# Patient Record
Sex: MALE | Race: WHITE | NOT HISPANIC OR LATINO | Employment: STUDENT | URBAN - METROPOLITAN AREA
[De-identification: names, ages, dates, MRNs, and addresses within clinical notes are randomized per-mention and may not be internally consistent; named-entity substitution may affect disease eponyms.]

---

## 2017-01-16 ENCOUNTER — OFFICE VISIT (OUTPATIENT)
Dept: OTOLARYNGOLOGY | Facility: CLINIC | Age: 22
End: 2017-01-16
Payer: COMMERCIAL

## 2017-01-16 VITALS — HEART RATE: 72 BPM | DIASTOLIC BLOOD PRESSURE: 80 MMHG | SYSTOLIC BLOOD PRESSURE: 134 MMHG

## 2017-01-16 DIAGNOSIS — M95.0 NASAL DEFORMITY, ACQUIRED: Primary | ICD-10-CM

## 2017-01-16 DIAGNOSIS — J34.2 NASAL SEPTAL DEVIATION: ICD-10-CM

## 2017-01-16 DIAGNOSIS — Z98.890 POST-OPERATIVE STATE: ICD-10-CM

## 2017-01-16 DIAGNOSIS — J34.3 NASAL TURBINATE HYPERTROPHY: ICD-10-CM

## 2017-01-16 DIAGNOSIS — J35.1 TONSILLAR HYPERTROPHY: ICD-10-CM

## 2017-01-16 PROCEDURE — 99024 POSTOP FOLLOW-UP VISIT: CPT | Mod: S$GLB,,, | Performed by: OTOLARYNGOLOGY

## 2017-01-16 PROCEDURE — 99999 PR PBB SHADOW E&M-EST. PATIENT-LVL II: CPT | Mod: PBBFAC,,, | Performed by: OTOLARYNGOLOGY

## 2017-01-16 NOTE — PROGRESS NOTES
One month S/P nasal reconstruction with bilateral  grafts,septo,turbs, and tonsillectomy.  Healing well,septum and nose straight,airway clear.  Throat healing well.  Reviewed post op instructions again with him.  RTC 2 months

## 2017-07-13 ENCOUNTER — OFFICE VISIT (OUTPATIENT)
Dept: ENDOCRINOLOGY | Facility: CLINIC | Age: 22
End: 2017-07-13
Payer: COMMERCIAL

## 2017-07-13 ENCOUNTER — PATIENT MESSAGE (OUTPATIENT)
Dept: ENDOCRINOLOGY | Facility: CLINIC | Age: 22
End: 2017-07-13

## 2017-07-13 VITALS
SYSTOLIC BLOOD PRESSURE: 118 MMHG | HEIGHT: 73 IN | DIASTOLIC BLOOD PRESSURE: 80 MMHG | BODY MASS INDEX: 28.81 KG/M2 | HEART RATE: 68 BPM | WEIGHT: 217.38 LBS

## 2017-07-13 DIAGNOSIS — E03.9 HYPOTHYROIDISM (ACQUIRED): Primary | ICD-10-CM

## 2017-07-13 DIAGNOSIS — E78.1 HYPERTRIGLYCERIDEMIA: ICD-10-CM

## 2017-07-13 PROCEDURE — 99204 OFFICE O/P NEW MOD 45 MIN: CPT | Mod: S$GLB,,, | Performed by: INTERNAL MEDICINE

## 2017-07-13 PROCEDURE — 99999 PR PBB SHADOW E&M-EST. PATIENT-LVL III: CPT | Mod: PBBFAC,,, | Performed by: INTERNAL MEDICINE

## 2017-07-13 NOTE — PROGRESS NOTES
Subjective:      Patient ID: Adi Siddiqui Jr. is a 21 y.o. male.    Chief Complaint:  Thyroid Problem      History of Present Illness  Mr. Siddiqui presents for evaluation and management of hypothyroidism.    Was diagnosed at age 13 with hypothyroidism.     Current thyroid hormone dose is levothyroxine 175 mcg PO daily.  Takes in the morning on empty stomach. Misses dose at least 2 days per week.    Has some fatigue. Weight has been stable. No heat or cold intolerance. No dry skin. No excessive hair loss.  No heart palpitations, tremor or increased anxiousness.      Also takes Tricor for hypertriglyceridemia.     No history of thyroid nodules. Reports having a normal thyroid ultrasound 3 years ago.    Review of Systems   Constitutional: Negative for unexpected weight change.   Eyes: Negative for visual disturbance.   Respiratory: Negative for shortness of breath.    Cardiovascular: Negative for chest pain.   Gastrointestinal: Negative for abdominal pain.   Endocrine: Negative for cold intolerance.   Genitourinary: Negative for frequency.   Musculoskeletal: Negative for arthralgias.   Skin: Negative for rash.   Neurological: Negative for headaches.       Objective:   Physical Exam   Constitutional: He is oriented to person, place, and time. He appears well-developed and well-nourished.   HENT:   Head: Normocephalic and atraumatic.   Right Ear: External ear normal.   Left Ear: External ear normal.   Nose: Nose normal.   Neck: No tracheal deviation present. No thyromegaly present.   Cardiovascular: Normal rate, regular rhythm and normal heart sounds.    No edema   Pulmonary/Chest: Effort normal and breath sounds normal.   Abdominal: Soft. Bowel sounds are normal. There is no tenderness.   Musculoskeletal:   Normal gait, no cyanosis or clubbing   Neurological: He is alert and oriented to person, place, and time. He has normal reflexes.   Vibration sense intact   Skin: Skin is warm and dry. No rash noted.   No  nodules, no ulcers   Psychiatric: He has a normal mood and affect. Judgment normal.   Vitals reviewed.      Lab Review:   Results for AIDE LOBO JR. (MRN [id number]) as of 7/13/2017 10:45   Ref. Range 11/23/2016 11:40   Sodium Latest Ref Range: 136 - 145 mmol/L 139   Potassium Latest Ref Range: 3.5 - 5.1 mmol/L 3.7   Chloride Latest Ref Range: 95 - 110 mmol/L 104   CO2 Latest Ref Range: 23 - 29 mmol/L 26   Anion Gap Latest Ref Range: 8 - 16 mmol/L 9   BUN, Bld Latest Ref Range: 6 - 20 mg/dL 11   Creatinine Latest Ref Range: 0.5 - 1.4 mg/dL 0.9   eGFR if non African American Latest Ref Range: >60 mL/min/1.73 m^2 >60.0   eGFR if African American Latest Ref Range: >60 mL/min/1.73 m^2 >60.0   Glucose Latest Ref Range: 70 - 110 mg/dL 83   Calcium Latest Ref Range: 8.7 - 10.5 mg/dL 9.6     Reviewed outside labs from 6/2016:  TSH 0.7  FT4 1.6    Assessment:     1. Hypothyroidism (acquired)    2. Hypertriglyceridemia      Plan:     1.) Patient with longstanding hypothyroidism  --Having some fatigue which may be related to the hypothyroidism  --Last TSH 1 yr ago was normal  --Will repeat TSH now  --He will try and take his thyroid hormone daily going forward  --Will plan to repeat TSH in 8 weeks as the current dose of 175 mcg may be too high if he takes the pill daily    2.) Check lipid panel today  --Continue Tricor    Will also check CMP and CBC    RTC in 1 year    Escobar Méndez M.D. Staff Endocrinology

## 2017-07-14 ENCOUNTER — PATIENT MESSAGE (OUTPATIENT)
Dept: ENDOCRINOLOGY | Facility: CLINIC | Age: 22
End: 2017-07-14

## 2017-11-21 ENCOUNTER — LAB VISIT (OUTPATIENT)
Dept: LAB | Facility: HOSPITAL | Age: 22
End: 2017-11-21
Attending: INTERNAL MEDICINE
Payer: COMMERCIAL

## 2017-11-21 DIAGNOSIS — E03.9 HYPOTHYROIDISM (ACQUIRED): ICD-10-CM

## 2017-11-21 LAB — TSH SERPL DL<=0.005 MIU/L-ACNC: 2.39 UIU/ML

## 2017-11-21 PROCEDURE — 36415 COLL VENOUS BLD VENIPUNCTURE: CPT | Mod: PO

## 2017-11-21 PROCEDURE — 84443 ASSAY THYROID STIM HORMONE: CPT

## 2018-02-09 RX ORDER — FENOFIBRATE 48 MG/1
TABLET, FILM COATED ORAL
Qty: 90 TABLET | Refills: 3 | Status: SHIPPED | OUTPATIENT
Start: 2018-02-09 | End: 2019-02-22 | Stop reason: SDUPTHER

## 2018-02-09 RX ORDER — LEVOTHYROXINE SODIUM 175 UG/1
TABLET ORAL
Qty: 90 TABLET | Refills: 3 | Status: SHIPPED | OUTPATIENT
Start: 2018-02-09 | End: 2019-02-22 | Stop reason: SDUPTHER

## 2018-03-17 ENCOUNTER — HOSPITAL ENCOUNTER (EMERGENCY)
Facility: HOSPITAL | Age: 23
Discharge: HOME | End: 2018-03-17
Attending: EMERGENCY MEDICINE
Payer: COMMERCIAL

## 2018-03-17 VITALS
HEIGHT: 72 IN | HEART RATE: 97 BPM | SYSTOLIC BLOOD PRESSURE: 130 MMHG | RESPIRATION RATE: 16 BRPM | TEMPERATURE: 99 F | OXYGEN SATURATION: 98 % | BODY MASS INDEX: 29.12 KG/M2 | DIASTOLIC BLOOD PRESSURE: 67 MMHG | WEIGHT: 215 LBS

## 2018-03-17 DIAGNOSIS — S61.412A LACERATION OF LEFT HAND, INITIAL ENCOUNTER: Primary | ICD-10-CM

## 2018-03-17 DIAGNOSIS — T07.XXXA ABRASIONS OF MULTIPLE SITES: ICD-10-CM

## 2018-03-17 PROCEDURE — 25000003 PHARM REV CODE 250: Performed by: EMERGENCY MEDICINE

## 2018-03-17 PROCEDURE — 12001 RPR S/N/AX/GEN/TRNK 2.5CM/<: CPT

## 2018-03-17 PROCEDURE — 63600175 PHARM REV CODE 636 W HCPCS: Performed by: EMERGENCY MEDICINE

## 2018-03-17 PROCEDURE — 90715 TDAP VACCINE 7 YRS/> IM: CPT | Performed by: EMERGENCY MEDICINE

## 2018-03-17 PROCEDURE — 90471 IMMUNIZATION ADMIN: CPT | Performed by: EMERGENCY MEDICINE

## 2018-03-17 PROCEDURE — 99283 EMERGENCY DEPT VISIT LOW MDM: CPT | Mod: 25

## 2018-03-17 RX ORDER — CEPHALEXIN 250 MG/1
500 CAPSULE ORAL
Status: COMPLETED | OUTPATIENT
Start: 2018-03-17 | End: 2018-03-17

## 2018-03-17 RX ORDER — MUPIROCIN 20 MG/G
OINTMENT TOPICAL 3 TIMES DAILY
Qty: 22 G | Refills: 0 | Status: SHIPPED | OUTPATIENT
Start: 2018-03-17 | End: 2018-03-24

## 2018-03-17 RX ORDER — LIDOCAINE HYDROCHLORIDE 10 MG/ML
10 INJECTION, SOLUTION EPIDURAL; INFILTRATION; INTRACAUDAL; PERINEURAL
Status: COMPLETED | OUTPATIENT
Start: 2018-03-17 | End: 2018-03-17

## 2018-03-17 RX ORDER — CEPHALEXIN 500 MG/1
500 CAPSULE ORAL 4 TIMES DAILY
Qty: 28 CAPSULE | Refills: 0 | Status: SHIPPED | OUTPATIENT
Start: 2018-03-17 | End: 2018-03-24

## 2018-03-17 RX ADMIN — BACITRACIN ZINC NEOMYCIN SULFATE POLYMYXIN B SULFATE 15 G: 400; 3.5; 5 OINTMENT TOPICAL at 09:03

## 2018-03-17 RX ADMIN — CLOSTRIDIUM TETANI TOXOID ANTIGEN (FORMALDEHYDE INACTIVATED), CORYNEBACTERIUM DIPHTHERIAE TOXOID ANTIGEN (FORMALDEHYDE INACTIVATED), BORDETELLA PERTUSSIS TOXOID ANTIGEN (GLUTARALDEHYDE INACTIVATED), BORDETELLA PERTUSSIS FILAMENTOUS HEMAGGLUTININ ANTIGEN (FORMALDEHYDE INACTIVATED), BORDETELLA PERTUSSIS PERTACTIN ANTIGEN, AND BORDETELLA PERTUSSIS FIMBRIAE 2/3 ANTIGEN 0.5 ML: 5; 2; 2.5; 5; 3; 5 INJECTION, SUSPENSION INTRAMUSCULAR at 09:03

## 2018-03-17 RX ADMIN — CEPHALEXIN 500 MG: 250 CAPSULE ORAL at 09:03

## 2018-03-17 RX ADMIN — LIDOCAINE HYDROCHLORIDE 100 MG: 10 INJECTION, SOLUTION EPIDURAL; INFILTRATION; INTRACAUDAL; PERINEURAL at 08:03

## 2018-03-18 NOTE — ED PROVIDER NOTES
Encounter Date: 3/17/2018    SCRIBE #1 NOTE: I, Mona Christiansen, am scribing for, and in the presence of, .       History     Chief Complaint   Patient presents with    Laceration     Lt hand.  Fell onto glass about 2 hours PTA       03/17/2018 8:33 PM     Chief complaint: laceration      Adi Siddiqui Jr. is a 22 y.o. male who presents to the ED with a laceration to left hand. Patient states he was walking around a restaurant outside when he slipped on a step and fell acquiring multiple lacerations to bilateral hands and right forearm. Patient states his last tetanus was 6.5 years ago. He denies hitting his head, LOC, or any preceding symptoms.           The history is provided by the patient. No  was used.     Review of patient's allergies indicates:  No Known Allergies  Past Medical History:   Diagnosis Date    Hypertriglyceridemia     Hypothyroidism      Past Surgical History:   Procedure Laterality Date    EXCISION TURBINATE, SUBMUCOUS      NASAL RECONSTRUCTION      NASAL SEPTUM SURGERY      TONSILLECTOMY       Family History   Problem Relation Age of Onset    Hyperthyroidism Mother     Hyperlipidemia Father     Hypothyroidism Maternal Grandmother     Diabetes Neg Hx      Social History   Substance Use Topics    Smoking status: Never Smoker    Smokeless tobacco: Never Used    Alcohol use Yes     Review of Systems   Constitutional: Negative for fever.   HENT: Negative for sore throat.    Respiratory: Negative for shortness of breath.    Cardiovascular: Negative for chest pain.   Gastrointestinal: Negative for nausea and vomiting.   Genitourinary: Negative for dysuria.   Musculoskeletal: Negative for back pain.   Skin: Negative for rash.        Laceration   Neurological: Negative for weakness.   Hematological: Does not bruise/bleed easily.       Physical Exam     Initial Vitals [03/17/18 2027]   BP Pulse Resp Temp SpO2   130/67 97 16 98.8 °F (37.1 °C) 98 %      MAP        88         Physical Exam    Nursing note and vitals reviewed.  Constitutional: He appears well-developed and well-nourished. No distress.   HENT:   Head: Normocephalic and atraumatic.   Eyes: Conjunctivae and EOM are normal. Pupils are equal, round, and reactive to light.   Neck: Neck supple.   Cardiovascular: Normal rate, regular rhythm and normal heart sounds. Exam reveals no gallop and no friction rub.    No murmur heard.  Pulmonary/Chest: Breath sounds normal. No respiratory distress. He has no wheezes. He has no rhonchi. He has no rales.   Abdominal: Soft. Bowel sounds are normal. He exhibits no distension. There is no tenderness.   Musculoskeletal: Normal range of motion. He exhibits no edema or tenderness.   Neurological: He is alert and oriented to person, place, and time.   Skin: Skin is warm and dry.   Superficial laceration to the thenar eminence 1cm long left hand, 2cm laceration over palmar surface of left hand just proximal to 5th MCP joint that doesn't appear to involve the webbing space or deep enough to involve the tendon. Flexor tendon intact. 4 cm superficial scratches to right palm and 3 superficial lacerations to proximal posterior forearm.   Psychiatric: He has a normal mood and affect.         ED Course   Lac Repair  Date/Time: 3/17/2018 9:31 PM  Performed by: ABIGAIL CARVER  Authorized by: HAYLEY JACKSON   Consent Done: Not Needed  Location: palmar surface of left hand.  Laceration length: 2 cm  Foreign bodies: no foreign bodies  Tendon involvement: none  Nerve involvement: none  Vascular damage: no  Anesthesia: local infiltration    Anesthesia:  Local Anesthetic: lidocaine 1% without epinephrine  Patient sedated: no  Irrigation solution: saline  Irrigation method: syringe  Amount of cleaning: standard  Debridement: none  Degree of undermining: none  Skin closure: 4-0 Prolene  Number of sutures: 7  Approximation: close  Approximation difficulty: simple        Labs Reviewed - No data  to display          Medical Decision Making:   History:   Old Medical Records: I decided to obtain old medical records.  The patient's laceration was repaired without difficulty.  There are no signs of foreign body, neurovascular deficit, or infection at this time.  The patient has been given specific return precautions and referred to primary care for follow up.  I'll prescribe antibiotics given that the wound is been open for 2 hours and it was from a glass bottle on the ground.            Scribe Attestation:   Scribe #1: I performed the above scribed service and the documentation accurately describes the services I performed. I attest to the accuracy of the note.    I, Dr. Garcia Ford personally performed the services described in this documentation. All medical record entries made by the scribe were at my direction and in my presence.  I have reviewed the chart and agree that the record reflects my personal performance and is accurate and complete. Garcia Ford MD.  4:46 PM 03/18/2018    DISCLAIMER: This note was prepared with Dragon NaturallySpeaking voice recognition transcription software. Garbled syntax, mangled pronouns, and other bizarre constructions may be attributed to that software system            Clinical Impression:   The primary encounter diagnosis was Laceration of left hand, initial encounter. A diagnosis of Abrasions of multiple sites was also pertinent to this visit.                           Garcia Ford MD  03/18/18 [id number]

## 2019-02-22 ENCOUNTER — PATIENT MESSAGE (OUTPATIENT)
Dept: INTERNAL MEDICINE | Facility: CLINIC | Age: 24
End: 2019-02-22

## 2019-02-22 ENCOUNTER — OFFICE VISIT (OUTPATIENT)
Dept: INTERNAL MEDICINE | Facility: CLINIC | Age: 24
End: 2019-02-22
Payer: COMMERCIAL

## 2019-02-22 ENCOUNTER — LAB VISIT (OUTPATIENT)
Dept: LAB | Facility: HOSPITAL | Age: 24
End: 2019-02-22
Attending: INTERNAL MEDICINE
Payer: COMMERCIAL

## 2019-02-22 VITALS
WEIGHT: 253.31 LBS | DIASTOLIC BLOOD PRESSURE: 86 MMHG | HEIGHT: 73 IN | BODY MASS INDEX: 33.57 KG/M2 | HEART RATE: 70 BPM | SYSTOLIC BLOOD PRESSURE: 132 MMHG | OXYGEN SATURATION: 96 %

## 2019-02-22 DIAGNOSIS — E03.9 HYPOTHYROIDISM (ACQUIRED): ICD-10-CM

## 2019-02-22 DIAGNOSIS — Z00.00 ROUTINE PHYSICAL EXAMINATION: ICD-10-CM

## 2019-02-22 DIAGNOSIS — E78.1 HYPERTRIGLYCERIDEMIA: ICD-10-CM

## 2019-02-22 DIAGNOSIS — E78.1 HYPERTRIGLYCERIDEMIA: Primary | ICD-10-CM

## 2019-02-22 DIAGNOSIS — Z00.00 ROUTINE PHYSICAL EXAMINATION: Primary | ICD-10-CM

## 2019-02-22 LAB
ALBUMIN SERPL BCP-MCNC: 4 G/DL
ALP SERPL-CCNC: 77 U/L
ALT SERPL W/O P-5'-P-CCNC: 72 U/L
ANION GAP SERPL CALC-SCNC: 10 MMOL/L
AST SERPL-CCNC: 42 U/L
BASOPHILS # BLD AUTO: 0.05 K/UL
BASOPHILS NFR BLD: 0.7 %
BILIRUB SERPL-MCNC: 0.4 MG/DL
BUN SERPL-MCNC: 11 MG/DL
CALCIUM SERPL-MCNC: 10 MG/DL
CHLORIDE SERPL-SCNC: 103 MMOL/L
CHOLEST SERPL-MCNC: 225 MG/DL
CHOLEST/HDLC SERPL: 7.8 {RATIO}
CO2 SERPL-SCNC: 25 MMOL/L
CREAT SERPL-MCNC: 0.9 MG/DL
DIFFERENTIAL METHOD: NORMAL
EOSINOPHIL # BLD AUTO: 0.4 K/UL
EOSINOPHIL NFR BLD: 5.5 %
ERYTHROCYTE [DISTWIDTH] IN BLOOD BY AUTOMATED COUNT: 12.4 %
EST. GFR  (AFRICAN AMERICAN): >60 ML/MIN/1.73 M^2
EST. GFR  (NON AFRICAN AMERICAN): >60 ML/MIN/1.73 M^2
GLUCOSE SERPL-MCNC: 90 MG/DL
HCT VFR BLD AUTO: 44.3 %
HDLC SERPL-MCNC: 29 MG/DL
HDLC SERPL: 12.9 %
HGB BLD-MCNC: 15.1 G/DL
LDLC SERPL CALC-MCNC: ABNORMAL MG/DL
LYMPHOCYTES # BLD AUTO: 1.9 K/UL
LYMPHOCYTES NFR BLD: 25 %
MCH RBC QN AUTO: 30.4 PG
MCHC RBC AUTO-ENTMCNC: 34.1 G/DL
MCV RBC AUTO: 89 FL
MONOCYTES # BLD AUTO: 0.5 K/UL
MONOCYTES NFR BLD: 6.8 %
NEUTROPHILS # BLD AUTO: 4.7 K/UL
NEUTROPHILS NFR BLD: 61.7 %
NONHDLC SERPL-MCNC: 196 MG/DL
PLATELET # BLD AUTO: 237 K/UL
PMV BLD AUTO: 9.5 FL
POTASSIUM SERPL-SCNC: 4.2 MMOL/L
PROT SERPL-MCNC: 8.2 G/DL
RBC # BLD AUTO: 4.97 M/UL
SODIUM SERPL-SCNC: 138 MMOL/L
T4 FREE SERPL-MCNC: 0.94 NG/DL
TRIGL SERPL-MCNC: 1276 MG/DL
TSH SERPL DL<=0.005 MIU/L-ACNC: 6.06 UIU/ML
WBC # BLD AUTO: 7.64 K/UL

## 2019-02-22 PROCEDURE — 80053 COMPREHEN METABOLIC PANEL: CPT

## 2019-02-22 PROCEDURE — 99999 PR PBB SHADOW E&M-EST. PATIENT-LVL III: ICD-10-PCS | Mod: PBBFAC,,, | Performed by: INTERNAL MEDICINE

## 2019-02-22 PROCEDURE — 84439 ASSAY OF FREE THYROXINE: CPT

## 2019-02-22 PROCEDURE — 99385 PR PREVENTIVE VISIT,NEW,18-39: ICD-10-PCS | Mod: S$GLB,,, | Performed by: INTERNAL MEDICINE

## 2019-02-22 PROCEDURE — 99999 PR PBB SHADOW E&M-EST. PATIENT-LVL III: CPT | Mod: PBBFAC,,, | Performed by: INTERNAL MEDICINE

## 2019-02-22 PROCEDURE — 80061 LIPID PANEL: CPT

## 2019-02-22 PROCEDURE — 84443 ASSAY THYROID STIM HORMONE: CPT

## 2019-02-22 PROCEDURE — 85025 COMPLETE CBC W/AUTO DIFF WBC: CPT

## 2019-02-22 PROCEDURE — 36415 COLL VENOUS BLD VENIPUNCTURE: CPT

## 2019-02-22 PROCEDURE — 99385 PREV VISIT NEW AGE 18-39: CPT | Mod: S$GLB,,, | Performed by: INTERNAL MEDICINE

## 2019-02-22 RX ORDER — LEVOTHYROXINE SODIUM 175 UG/1
175 TABLET ORAL DAILY
Qty: 90 TABLET | Refills: 3 | Status: SHIPPED | OUTPATIENT
Start: 2019-02-22 | End: 2020-02-28

## 2019-02-22 RX ORDER — FENOFIBRATE 48 MG/1
48 TABLET, FILM COATED ORAL DAILY
Qty: 90 TABLET | Refills: 3 | Status: SHIPPED | OUTPATIENT
Start: 2019-02-22 | End: 2020-02-28

## 2019-02-22 NOTE — PROGRESS NOTES
Subjective:       Patient ID: Adi Siddiqui Jr. is a 23 y.o. male.    Chief Complaint: Establish Care    3-year-old new patient to me initially seen with history portion the alone.  He has had hypothyroidism since age 13 has been fenofibrate for dyslipidemia years.  He admits he sometimes goes few weeks without taking medication and we had a lengthy discussion about this hypothyroidism side.  He has been in and out of college works almost full-time but is planning on going back school.  Wants to do journalism.  He denies any symptoms or side effects.  We did discuss depression briefly.  He denies suicidal homicidal ideation.  Says it has been minor and his previous doctor thought related to thyroid.  We discussed seeing psychiatry or trying medication but he was not interested for now.      Review of Systems   Constitutional: Positive for fatigue. Negative for activity change, chills, fever and unexpected weight change.   HENT: Negative for hearing loss, nosebleeds, rhinorrhea and trouble swallowing.    Eyes: Negative for pain, discharge and visual disturbance.   Respiratory: Negative for cough, chest tightness, shortness of breath and wheezing.    Cardiovascular: Negative for chest pain and palpitations.   Gastrointestinal: Negative for abdominal pain, blood in stool, constipation, diarrhea, nausea and vomiting.   Endocrine: Negative for polydipsia and polyuria.   Genitourinary: Negative for difficulty urinating, hematuria and urgency.   Musculoskeletal: Negative for arthralgias, joint swelling and neck pain.   Skin: Negative for rash.   Neurological: Negative for dizziness, weakness and headaches.   Hematological: Does not bruise/bleed easily.   Psychiatric/Behavioral: Positive for dysphoric mood. Negative for confusion, sleep disturbance and suicidal ideas.       Objective:      Physical Exam   Constitutional: He is oriented to person, place, and time. He appears well-developed and well-nourished. No  distress.   HENT:   Head: Normocephalic and atraumatic.   Right Ear: External ear normal.   Left Ear: External ear normal.   Mouth/Throat: Oropharynx is clear and moist. No oropharyngeal exudate.   TM's clear, pharynx clear   Eyes: Conjunctivae and EOM are normal. Pupils are equal, round, and reactive to light. No scleral icterus.   Neck: Normal range of motion. Neck supple. No thyromegaly present.   No supraclavicular nodes palpated   Cardiovascular: Normal rate, regular rhythm and normal heart sounds.   No murmur heard.  Pulmonary/Chest: Effort normal and breath sounds normal. He has no wheezes.   Abdominal: Soft. Bowel sounds are normal. He exhibits no mass. There is no tenderness. No hernia. Hernia confirmed negative in the right inguinal area and confirmed negative in the left inguinal area.   Genitourinary: Penis normal. Right testis shows no mass. Left testis shows no mass. No penile tenderness.   Musculoskeletal: He exhibits no edema.   Lymphadenopathy:     He has no cervical adenopathy.   Neurological: He is alert and oriented to person, place, and time.   Skin: No rash noted. No erythema. No pallor.   Psychiatric: He has a normal mood and affect. His behavior is normal.       Assessment:       1. Routine physical examination    2. Hypothyroidism (acquired)    3. Hypertriglyceridemia        Plan:       Adi was seen today for establish care.    Diagnoses and all orders for this visit:    Routine physical examination  -     Lipid panel; Future  -     CBC auto differential; Future  -     Comprehensive metabolic panel; Future  -     TSH; Future    Hypothyroidism (acquired)  -     TSH; Future    Hypertriglyceridemia  -     Lipid panel; Future    Other orders  -     fenofibrate (TRICOR) 48 MG tablet; Take 1 tablet (48 mg total) by mouth once daily.  -     levothyroxine (SYNTHROID, LEVOTHROID) 175 MCG tablet; Take 1 tablet (175 mcg total) by mouth once daily.

## 2019-10-15 ENCOUNTER — PATIENT MESSAGE (OUTPATIENT)
Dept: INTERNAL MEDICINE | Facility: CLINIC | Age: 24
End: 2019-10-15

## 2019-10-15 DIAGNOSIS — E03.9 HYPOTHYROIDISM (ACQUIRED): ICD-10-CM

## 2019-10-15 DIAGNOSIS — E78.1 HYPERTRIGLYCERIDEMIA: Primary | ICD-10-CM

## 2019-11-05 ENCOUNTER — PATIENT MESSAGE (OUTPATIENT)
Dept: INTERNAL MEDICINE | Facility: CLINIC | Age: 24
End: 2019-11-05

## 2019-11-08 ENCOUNTER — LAB VISIT (OUTPATIENT)
Dept: LAB | Facility: HOSPITAL | Age: 24
End: 2019-11-08
Attending: INTERNAL MEDICINE
Payer: COMMERCIAL

## 2019-11-08 DIAGNOSIS — E03.9 HYPOTHYROIDISM (ACQUIRED): ICD-10-CM

## 2019-11-08 DIAGNOSIS — E78.1 HYPERTRIGLYCERIDEMIA: ICD-10-CM

## 2019-11-08 LAB
T4 FREE SERPL-MCNC: 1 NG/DL (ref 0.71–1.51)
TSH SERPL DL<=0.005 MIU/L-ACNC: 6.03 UIU/ML (ref 0.4–4)

## 2019-11-08 PROCEDURE — 84439 ASSAY OF FREE THYROXINE: CPT

## 2019-11-08 PROCEDURE — 84443 ASSAY THYROID STIM HORMONE: CPT

## 2019-11-08 PROCEDURE — 36415 COLL VENOUS BLD VENIPUNCTURE: CPT

## 2019-11-10 ENCOUNTER — PATIENT MESSAGE (OUTPATIENT)
Dept: INTERNAL MEDICINE | Facility: CLINIC | Age: 24
End: 2019-11-10

## 2019-12-05 ENCOUNTER — PATIENT MESSAGE (OUTPATIENT)
Dept: INTERNAL MEDICINE | Facility: CLINIC | Age: 24
End: 2019-12-05

## 2020-02-28 RX ORDER — FENOFIBRATE 48 MG/1
TABLET, FILM COATED ORAL
Qty: 90 TABLET | Refills: 4 | Status: SHIPPED | OUTPATIENT
Start: 2020-02-28 | End: 2020-02-28 | Stop reason: SDUPTHER

## 2020-02-28 RX ORDER — LEVOTHYROXINE SODIUM 175 UG/1
TABLET ORAL
Qty: 90 TABLET | Refills: 0 | Status: SHIPPED | OUTPATIENT
Start: 2020-02-28 | End: 2020-03-10 | Stop reason: SDUPTHER

## 2020-02-28 RX ORDER — FENOFIBRATE 48 MG/1
48 TABLET, FILM COATED ORAL DAILY
Qty: 90 TABLET | Refills: 4 | Status: SHIPPED | OUTPATIENT
Start: 2020-02-28 | End: 2020-03-10 | Stop reason: SDUPTHER

## 2020-02-28 NOTE — TELEPHONE ENCOUNTER
----- Message from Lauren Marr sent at 2/28/2020  4:18 PM CST -----  Contact: Pt-- [phone]  Type:  Needs Medical Advice    Who Called: Pt    Best Call Back Number: [phone]    Additional Information: Pt scheduled for labs without orders for 3/4.

## 2020-02-28 NOTE — TELEPHONE ENCOUNTER
----- Message from Lauren Marr sent at 2/28/2020  4:09 PM CST -----  Contact: Pt--- [phone]  Type:  RX Refill Request    Who Called:  Pt    Refill or New Rx: refill    RX Name and Strength:  fenofibrate (TRICOR) 48 MG tablet    levothyroxine (SYNTHROID, LEVOTHROID) 175 MCG tablet     Preferred Pharmacy with phone number: [address] [address] [facility] [phone] (Phone)  [phone] (Fax)    Would the patient rather a call back or a response via [facility]? Call    Best Call Back Number: [phone]

## 2020-03-09 ENCOUNTER — OFFICE VISIT (OUTPATIENT)
Dept: INTERNAL MEDICINE | Facility: CLINIC | Age: 25
End: 2020-03-09
Payer: COMMERCIAL

## 2020-03-09 ENCOUNTER — LAB VISIT (OUTPATIENT)
Dept: LAB | Facility: HOSPITAL | Age: 25
End: 2020-03-09
Attending: INTERNAL MEDICINE
Payer: COMMERCIAL

## 2020-03-09 VITALS
OXYGEN SATURATION: 98 % | HEIGHT: 72 IN | SYSTOLIC BLOOD PRESSURE: 122 MMHG | WEIGHT: 239.63 LBS | DIASTOLIC BLOOD PRESSURE: 82 MMHG | HEART RATE: 56 BPM | BODY MASS INDEX: 32.46 KG/M2

## 2020-03-09 DIAGNOSIS — E78.1 HYPERTRIGLYCERIDEMIA: Primary | ICD-10-CM

## 2020-03-09 DIAGNOSIS — E03.9 HYPOTHYROIDISM (ACQUIRED): ICD-10-CM

## 2020-03-09 DIAGNOSIS — E78.1 HYPERTRIGLYCERIDEMIA: ICD-10-CM

## 2020-03-09 LAB
ALBUMIN SERPL BCP-MCNC: 4.1 G/DL (ref 3.5–5.2)
ALP SERPL-CCNC: 81 U/L (ref 55–135)
ALT SERPL W/O P-5'-P-CCNC: 64 U/L (ref 10–44)
ANION GAP SERPL CALC-SCNC: 9 MMOL/L (ref 8–16)
AST SERPL-CCNC: 39 U/L (ref 10–40)
BILIRUB SERPL-MCNC: 0.9 MG/DL (ref 0.1–1)
BUN SERPL-MCNC: 7 MG/DL (ref 6–20)
CALCIUM SERPL-MCNC: 9.4 MG/DL (ref 8.7–10.5)
CHLORIDE SERPL-SCNC: 105 MMOL/L (ref 95–110)
CHOLEST SERPL-MCNC: 161 MG/DL (ref 120–199)
CHOLEST/HDLC SERPL: 4.6 {RATIO} (ref 2–5)
CO2 SERPL-SCNC: 26 MMOL/L (ref 23–29)
CREAT SERPL-MCNC: 0.9 MG/DL (ref 0.5–1.4)
EST. GFR  (AFRICAN AMERICAN): >60 ML/MIN/1.73 M^2
EST. GFR  (NON AFRICAN AMERICAN): >60 ML/MIN/1.73 M^2
GLUCOSE SERPL-MCNC: 83 MG/DL (ref 70–110)
HDLC SERPL-MCNC: 35 MG/DL (ref 40–75)
HDLC SERPL: 21.7 % (ref 20–50)
LDLC SERPL CALC-MCNC: 47.6 MG/DL (ref 63–159)
NONHDLC SERPL-MCNC: 126 MG/DL
POTASSIUM SERPL-SCNC: 4.3 MMOL/L (ref 3.5–5.1)
PROT SERPL-MCNC: 7.3 G/DL (ref 6–8.4)
SODIUM SERPL-SCNC: 140 MMOL/L (ref 136–145)
TRIGL SERPL-MCNC: 392 MG/DL (ref 30–150)
TSH SERPL DL<=0.005 MIU/L-ACNC: 3.78 UIU/ML (ref 0.4–4)

## 2020-03-09 PROCEDURE — 99999 PR PBB SHADOW E&M-EST. PATIENT-LVL III: CPT | Mod: PBBFAC,,, | Performed by: INTERNAL MEDICINE

## 2020-03-09 PROCEDURE — 99395 PREV VISIT EST AGE 18-39: CPT | Mod: S$GLB,,, | Performed by: INTERNAL MEDICINE

## 2020-03-09 PROCEDURE — 84443 ASSAY THYROID STIM HORMONE: CPT

## 2020-03-09 PROCEDURE — 36415 COLL VENOUS BLD VENIPUNCTURE: CPT

## 2020-03-09 PROCEDURE — 80053 COMPREHEN METABOLIC PANEL: CPT

## 2020-03-09 PROCEDURE — 99395 PR PREVENTIVE VISIT,EST,18-39: ICD-10-PCS | Mod: S$GLB,,, | Performed by: INTERNAL MEDICINE

## 2020-03-09 PROCEDURE — 80061 LIPID PANEL: CPT

## 2020-03-09 PROCEDURE — 3008F PR BODY MASS INDEX (BMI) DOCUMENTED: ICD-10-PCS | Mod: CPTII,S$GLB,, | Performed by: INTERNAL MEDICINE

## 2020-03-09 PROCEDURE — 3008F BODY MASS INDEX DOCD: CPT | Mod: CPTII,S$GLB,, | Performed by: INTERNAL MEDICINE

## 2020-03-09 PROCEDURE — 99999 PR PBB SHADOW E&M-EST. PATIENT-LVL III: ICD-10-PCS | Mod: PBBFAC,,, | Performed by: INTERNAL MEDICINE

## 2020-03-09 NOTE — PROGRESS NOTES
Subjective:       Patient ID: Adi Siddiqui Jr. is a 24 y.o. male.    Chief Complaint: Annual Exam    Patient comes in for annual exam.  He has a history hypothyroidism and hyperlipidemia with hypertriglyceridemia.  Overall he is doing a better job it taking his medicine, he has lost some weight.  He denies any active or acute complaints or problems. No chest pain or shortness of breath.  He is fasting today so we will get his labs tested and adjust medication if needed.  He has no other active or acute complaints.  I strongly urged him of the need to work on further weight reduction.    Review of Systems   Constitutional: Negative for chills, fatigue, fever and unexpected weight change.   HENT: Negative for nosebleeds and trouble swallowing.    Eyes: Negative for pain and visual disturbance.   Respiratory: Negative for cough, shortness of breath and wheezing.    Cardiovascular: Negative for chest pain and palpitations.   Gastrointestinal: Negative for abdominal pain, constipation, diarrhea, nausea and vomiting.   Genitourinary: Negative for difficulty urinating and hematuria.   Musculoskeletal: Negative for neck pain.   Skin: Negative for rash.   Neurological: Negative for dizziness and headaches.   Hematological: Does not bruise/bleed easily.   Psychiatric/Behavioral: Negative for dysphoric mood, sleep disturbance and suicidal ideas.       Objective:      Physical Exam   Constitutional: He is oriented to person, place, and time. He appears well-developed and well-nourished. No distress.   HENT:   Head: Normocephalic and atraumatic.   Right Ear: External ear normal.   Left Ear: External ear normal.   Mouth/Throat: Oropharynx is clear and moist. No oropharyngeal exudate.   TM's clear, pharynx clear   Eyes: Pupils are equal, round, and reactive to light. Conjunctivae and EOM are normal. No scleral icterus.   Neck: Normal range of motion. Neck supple. No thyromegaly present.   No supraclavicular nodes palpated    Cardiovascular: Normal rate, regular rhythm and normal heart sounds.   No murmur heard.  Pulmonary/Chest: Effort normal and breath sounds normal. He has no wheezes.   Abdominal: Soft. Bowel sounds are normal. He exhibits no mass. There is no tenderness.   Musculoskeletal: He exhibits no edema.   Lymphadenopathy:     He has no cervical adenopathy.   Neurological: He is alert and oriented to person, place, and time.   Skin: No rash noted. No erythema. No pallor.   Psychiatric: He has a normal mood and affect. His behavior is normal.       Assessment:       1. Hypertriglyceridemia    2. Hypothyroidism (acquired)        Plan:       Adi was seen today for annual exam.    Diagnoses and all orders for this visit:    Hypertriglyceridemia  -     Lipid panel; Future  -     TSH; Future  -     Comprehensive metabolic panel; Future    Hypothyroidism (acquired)  -     Lipid panel; Future  -     TSH; Future  -     Comprehensive metabolic panel; Future

## 2020-03-10 ENCOUNTER — TELEPHONE (OUTPATIENT)
Dept: INTERNAL MEDICINE | Facility: CLINIC | Age: 25
End: 2020-03-10

## 2020-03-10 ENCOUNTER — PATIENT MESSAGE (OUTPATIENT)
Dept: INTERNAL MEDICINE | Facility: CLINIC | Age: 25
End: 2020-03-10

## 2020-03-10 RX ORDER — LEVOTHYROXINE SODIUM 175 UG/1
175 TABLET ORAL DAILY
Qty: 90 TABLET | Refills: 4 | Status: SHIPPED | OUTPATIENT
Start: 2020-03-10 | End: 2021-02-19 | Stop reason: SDUPTHER

## 2020-03-10 RX ORDER — FENOFIBRATE 48 MG/1
48 TABLET, FILM COATED ORAL DAILY
Qty: 90 TABLET | Refills: 4 | Status: SHIPPED | OUTPATIENT
Start: 2020-03-10 | End: 2021-03-24 | Stop reason: SDUPTHER

## 2020-09-21 ENCOUNTER — OFFICE VISIT (OUTPATIENT)
Dept: CARDIOLOGY | Facility: CLINIC | Age: 25
End: 2020-09-21
Payer: COMMERCIAL

## 2020-09-21 VITALS
HEART RATE: 81 BPM | HEIGHT: 73 IN | DIASTOLIC BLOOD PRESSURE: 82 MMHG | RESPIRATION RATE: 16 BRPM | BODY MASS INDEX: 31.68 KG/M2 | OXYGEN SATURATION: 98 % | SYSTOLIC BLOOD PRESSURE: 128 MMHG | WEIGHT: 239 LBS

## 2020-09-21 DIAGNOSIS — E03.9 ACQUIRED HYPOTHYROIDISM: ICD-10-CM

## 2020-09-21 DIAGNOSIS — E78.1 FAMILIAL HYPERTRIGLYCERIDEMIA: Primary | ICD-10-CM

## 2020-09-21 DIAGNOSIS — F32.A DEPRESSION, UNSPECIFIED DEPRESSION TYPE: ICD-10-CM

## 2020-09-21 DIAGNOSIS — E66.01 MORBID OBESITY: ICD-10-CM

## 2020-09-21 PROCEDURE — 99204 PR OFFICE/OUTPT VISIT, NEW, LEVL IV, 45-59 MIN: ICD-10-PCS | Mod: S$GLB,,, | Performed by: INTERNAL MEDICINE

## 2020-09-21 PROCEDURE — 99204 OFFICE O/P NEW MOD 45 MIN: CPT | Mod: S$GLB,,, | Performed by: INTERNAL MEDICINE

## 2020-09-21 PROCEDURE — 3008F PR BODY MASS INDEX (BMI) DOCUMENTED: ICD-10-PCS | Mod: CPTII,S$GLB,, | Performed by: INTERNAL MEDICINE

## 2020-09-21 PROCEDURE — 3008F BODY MASS INDEX DOCD: CPT | Mod: CPTII,S$GLB,, | Performed by: INTERNAL MEDICINE

## 2020-09-21 NOTE — PROGRESS NOTES
Subjective:   @SUBJNOHEADERBEGIN  @Patient ID:  Adi Siddiqui Jr. is a 25 y.o. @SEX  @ who presents for evaluation of Results (echo)      HPI:      Review of patient's allergies indicates:  No Known Allergies    Past Medical History:   Diagnosis Date    Depression     Hypertriglyceridemia     Hypothyroidism      Past Surgical History:   Procedure Laterality Date    EXCISION TURBINATE, SUBMUCOUS      NASAL RECONSTRUCTION      NASAL SEPTUM SURGERY      TONSILLECTOMY       Social History     Tobacco Use    Smoking status: Never Smoker    Smokeless tobacco: Never Used   Substance Use Topics    Alcohol use: Yes    Drug use: Not on file        Review of Systems:     ROS  Review of Systems   Constitution: Negative for diaphoresis and fever.   HENT: Negative for nosebleeds.    Cardiovascular: Negative for chest pain, dyspnea on exertion, leg swelling and palpitations.   Respiratory: Negative for shortness of breath and wheezing.    Hematologic/Lymphatic: Negative for bleeding problem. Does not bruise/bleed easily.   Skin: Negative for color change and rash.   Musculoskeletal: Negative for falls and myalgias.   Gastrointestinal: Negative for hematemesis and hematochezia.   Genitourinary: Negative for hematuria.   Neurological: Negative for dizziness and light-headedness.   Psychiatric/Behavioral: Negative for altered mental status and memory loss.          Objective:        Vitals:    09/21/20 1257   BP: 128/82   Pulse: 81   Resp: 16       Lab Results   Component Value Date    WBC 7.64 02/22/2019    HGB 15.1 02/22/2019    HCT 44.3 02/22/2019     02/22/2019    CHOL 161 03/09/2020    TRIG 392 (H) 03/09/2020    HDL 35 (L) 03/09/2020    ALT 64 (H) 03/09/2020    AST 39 03/09/2020     03/09/2020    K 4.3 03/09/2020     03/09/2020    CREATININE 0.9 03/09/2020    BUN 7 03/09/2020    CO2 26 03/09/2020    TSH 3.780 03/09/2020        ECHOCARDIOGRAM RESULTS  No results found for this or any previous  visit.      CURRENT/PREVIOUS VISIT EKG  No results found for this or any previous visit.  No procedure found.   No results found for this or any previous visit.    Physical Exam:    Physical Exam   HEENT: Normocephalic, atraumatic,   PERRL, Conjunctiva pink, no scleral icterus.   NECK:  No JVD no carotid bruit  CVS: S1S2+, RRR, no murmurs, rubs or gallops, JVP: Normal.  LUNGS: Clear  ABDOMEN: Soft, NT, BS+  EXTREMITIES: No cyanosis, clubbing or edema  Gu: No garcia catheter, denies dysuria, no hematuria  NEURO: AAO X 3.   PSY :  Normal affect        Medication List with Changes/Refills   Current Medications    FENOFIBRATE (TRICOR) 48 MG TABLET    Take 1 tablet (48 mg total) by mouth once daily.    LEVOTHYROXINE (SYNTHROID, LEVOTHROID) 175 MCG TABLET    Take 1 tablet (175 mcg total) by mouth once daily.               Assessment:   1.  Familial hypertriglyceridemia  2.  Hypothyroid  3.  Depression  4.  Obesity        Plan:   1.  Patient is been doing well his blood pressure has been stable 128/82.  2.  He had recent blood work done which showed triglycerides to be 392.  Discussed with patient in regards with triglycerides and probably he would benefit from a dietary consult.  And discussed with patient to cut down on carbohydrates since beats and chips and snacks in between and to have 4 small meals a day which are more nutritious and more proteinaceous in nature and LEs and carbohydrates.  3.  Patient is on levothyroxine continue the same.  4.  Patient not been exercising as much.  Patient to resume his exercises peripheral bili walk at least 3 miles a day.  However patient seems to be active during the daytime when he is at work.  5.  Patient had an echocardiogram done which essentially showed normal LV function and normal right ventricular systolic function and normal valvular function.  With ejection fraction of 56%.  6.  Continue current management and I will see him back in the office in a year's time  7.   Patient to follow-up with the primary physician in regards with his labs and also patient would probably benefit from more intensive therapy.    Problem List Items Addressed This Visit     None          No follow-ups on file.

## 2021-02-12 ENCOUNTER — CLINICAL SUPPORT (OUTPATIENT)
Dept: URGENT CARE | Facility: CLINIC | Age: 26
End: 2021-02-12
Payer: COMMERCIAL

## 2021-02-12 DIAGNOSIS — Z11.9 SCREENING EXAMINATION FOR UNSPECIFIED INFECTIOUS DISEASE: Primary | ICD-10-CM

## 2021-02-12 LAB
CTP QC/QA: YES
SARS-COV-2 RDRP RESP QL NAA+PROBE: NEGATIVE

## 2021-02-12 PROCEDURE — 99211 PR OFFICE/OUTPT VISIT, EST, LEVL I: ICD-10-PCS | Mod: S$GLB,,, | Performed by: FAMILY MEDICINE

## 2021-02-12 PROCEDURE — U0002 COVID-19 LAB TEST NON-CDC: HCPCS | Mod: QW,S$GLB,, | Performed by: FAMILY MEDICINE

## 2021-02-12 PROCEDURE — U0002: ICD-10-PCS | Mod: QW,S$GLB,, | Performed by: FAMILY MEDICINE

## 2021-02-12 PROCEDURE — 99211 OFF/OP EST MAY X REQ PHY/QHP: CPT | Mod: S$GLB,,, | Performed by: FAMILY MEDICINE

## 2021-02-14 ENCOUNTER — CLINICAL SUPPORT (OUTPATIENT)
Dept: URGENT CARE | Facility: CLINIC | Age: 26
End: 2021-02-14
Payer: COMMERCIAL

## 2021-02-14 DIAGNOSIS — Z11.9 SCREENING EXAMINATION FOR UNSPECIFIED INFECTIOUS DISEASE: Primary | ICD-10-CM

## 2021-02-14 LAB
CTP QC/QA: YES
SARS-COV-2 RDRP RESP QL NAA+PROBE: NEGATIVE

## 2021-02-14 PROCEDURE — 99211 PR OFFICE/OUTPT VISIT, EST, LEVL I: ICD-10-PCS | Mod: S$GLB,,, | Performed by: FAMILY MEDICINE

## 2021-02-14 PROCEDURE — U0002 COVID-19 LAB TEST NON-CDC: HCPCS | Mod: QW,S$GLB,, | Performed by: FAMILY MEDICINE

## 2021-02-14 PROCEDURE — 99211 OFF/OP EST MAY X REQ PHY/QHP: CPT | Mod: S$GLB,,, | Performed by: FAMILY MEDICINE

## 2021-02-14 PROCEDURE — U0002: ICD-10-PCS | Mod: QW,S$GLB,, | Performed by: FAMILY MEDICINE

## 2021-02-19 ENCOUNTER — PATIENT OUTREACH (OUTPATIENT)
Dept: ADMINISTRATIVE | Facility: OTHER | Age: 26
End: 2021-02-19

## 2021-02-19 ENCOUNTER — LAB VISIT (OUTPATIENT)
Dept: LAB | Facility: HOSPITAL | Age: 26
End: 2021-02-19
Attending: INTERNAL MEDICINE
Payer: COMMERCIAL

## 2021-02-19 ENCOUNTER — OFFICE VISIT (OUTPATIENT)
Dept: ENDOCRINOLOGY | Facility: CLINIC | Age: 26
End: 2021-02-19
Payer: COMMERCIAL

## 2021-02-19 ENCOUNTER — PATIENT MESSAGE (OUTPATIENT)
Dept: ENDOCRINOLOGY | Facility: CLINIC | Age: 26
End: 2021-02-19

## 2021-02-19 VITALS
HEART RATE: 73 BPM | WEIGHT: 236.13 LBS | OXYGEN SATURATION: 97 % | BODY MASS INDEX: 31.3 KG/M2 | DIASTOLIC BLOOD PRESSURE: 78 MMHG | SYSTOLIC BLOOD PRESSURE: 116 MMHG | HEIGHT: 73 IN

## 2021-02-19 DIAGNOSIS — F32.A DEPRESSION, UNSPECIFIED DEPRESSION TYPE: ICD-10-CM

## 2021-02-19 DIAGNOSIS — E78.1 FAMILIAL HYPERTRIGLYCERIDEMIA: ICD-10-CM

## 2021-02-19 DIAGNOSIS — E03.9 ACQUIRED HYPOTHYROIDISM: Primary | ICD-10-CM

## 2021-02-19 DIAGNOSIS — E03.9 ACQUIRED HYPOTHYROIDISM: ICD-10-CM

## 2021-02-19 LAB
ALBUMIN SERPL BCP-MCNC: 4.2 G/DL (ref 3.5–5.2)
ALP SERPL-CCNC: 69 U/L (ref 55–135)
ALT SERPL W/O P-5'-P-CCNC: 33 U/L (ref 10–44)
ANION GAP SERPL CALC-SCNC: 10 MMOL/L (ref 8–16)
AST SERPL-CCNC: 20 U/L (ref 10–40)
BILIRUB SERPL-MCNC: 0.6 MG/DL (ref 0.1–1)
BUN SERPL-MCNC: 10 MG/DL (ref 6–20)
CALCIUM SERPL-MCNC: 9 MG/DL (ref 8.7–10.5)
CHLORIDE SERPL-SCNC: 108 MMOL/L (ref 95–110)
CHOLEST SERPL-MCNC: 155 MG/DL (ref 120–199)
CHOLEST/HDLC SERPL: 5.2 {RATIO} (ref 2–5)
CO2 SERPL-SCNC: 23 MMOL/L (ref 23–29)
CREAT SERPL-MCNC: 0.8 MG/DL (ref 0.5–1.4)
EST. GFR  (AFRICAN AMERICAN): >60 ML/MIN/1.73 M^2
EST. GFR  (NON AFRICAN AMERICAN): >60 ML/MIN/1.73 M^2
GLUCOSE SERPL-MCNC: 96 MG/DL (ref 70–110)
HDLC SERPL-MCNC: 30 MG/DL (ref 40–75)
HDLC SERPL: 19.4 % (ref 20–50)
LDLC SERPL CALC-MCNC: 75.2 MG/DL (ref 63–159)
NONHDLC SERPL-MCNC: 125 MG/DL
POTASSIUM SERPL-SCNC: 3.9 MMOL/L (ref 3.5–5.1)
PROT SERPL-MCNC: 7.2 G/DL (ref 6–8.4)
SODIUM SERPL-SCNC: 141 MMOL/L (ref 136–145)
TRIGL SERPL-MCNC: 249 MG/DL (ref 30–150)
TSH SERPL DL<=0.005 MIU/L-ACNC: 2.74 UIU/ML (ref 0.4–4)

## 2021-02-19 PROCEDURE — 99999 PR PBB SHADOW E&M-EST. PATIENT-LVL III: ICD-10-PCS | Mod: PBBFAC,,, | Performed by: INTERNAL MEDICINE

## 2021-02-19 PROCEDURE — 99204 OFFICE O/P NEW MOD 45 MIN: CPT | Mod: S$GLB,,, | Performed by: INTERNAL MEDICINE

## 2021-02-19 PROCEDURE — 80053 COMPREHEN METABOLIC PANEL: CPT

## 2021-02-19 PROCEDURE — 99999 PR PBB SHADOW E&M-EST. PATIENT-LVL III: CPT | Mod: PBBFAC,,, | Performed by: INTERNAL MEDICINE

## 2021-02-19 PROCEDURE — 99204 PR OFFICE/OUTPT VISIT, NEW, LEVL IV, 45-59 MIN: ICD-10-PCS | Mod: S$GLB,,, | Performed by: INTERNAL MEDICINE

## 2021-02-19 PROCEDURE — 1126F AMNT PAIN NOTED NONE PRSNT: CPT | Mod: S$GLB,,, | Performed by: INTERNAL MEDICINE

## 2021-02-19 PROCEDURE — 80061 LIPID PANEL: CPT

## 2021-02-19 PROCEDURE — 3008F BODY MASS INDEX DOCD: CPT | Mod: CPTII,S$GLB,, | Performed by: INTERNAL MEDICINE

## 2021-02-19 PROCEDURE — 1126F PR PAIN SEVERITY QUANTIFIED, NO PAIN PRESENT: ICD-10-PCS | Mod: S$GLB,,, | Performed by: INTERNAL MEDICINE

## 2021-02-19 PROCEDURE — 36415 COLL VENOUS BLD VENIPUNCTURE: CPT

## 2021-02-19 PROCEDURE — 84443 ASSAY THYROID STIM HORMONE: CPT

## 2021-02-19 PROCEDURE — 3008F PR BODY MASS INDEX (BMI) DOCUMENTED: ICD-10-PCS | Mod: CPTII,S$GLB,, | Performed by: INTERNAL MEDICINE

## 2021-02-19 RX ORDER — LEVOTHYROXINE SODIUM 175 UG/1
175 TABLET ORAL DAILY
Qty: 90 TABLET | Refills: 3 | Status: SHIPPED | OUTPATIENT
Start: 2021-02-19 | End: 2022-03-23 | Stop reason: SDUPTHER

## 2021-04-07 ENCOUNTER — OFFICE VISIT (OUTPATIENT)
Dept: INTERNAL MEDICINE | Facility: CLINIC | Age: 26
End: 2021-04-07
Payer: COMMERCIAL

## 2021-04-07 VITALS
HEIGHT: 75 IN | OXYGEN SATURATION: 97 % | HEART RATE: 80 BPM | DIASTOLIC BLOOD PRESSURE: 64 MMHG | SYSTOLIC BLOOD PRESSURE: 118 MMHG | WEIGHT: 231.25 LBS | BODY MASS INDEX: 28.75 KG/M2

## 2021-04-07 DIAGNOSIS — Z00.00 ROUTINE PHYSICAL EXAMINATION: Primary | ICD-10-CM

## 2021-04-07 DIAGNOSIS — F32.A DEPRESSION, UNSPECIFIED DEPRESSION TYPE: ICD-10-CM

## 2021-04-07 DIAGNOSIS — E03.9 ACQUIRED HYPOTHYROIDISM: ICD-10-CM

## 2021-04-07 DIAGNOSIS — E78.1 HYPERTRIGLYCERIDEMIA: ICD-10-CM

## 2021-04-07 PROCEDURE — 1126F PR PAIN SEVERITY QUANTIFIED, NO PAIN PRESENT: ICD-10-PCS | Mod: S$GLB,,, | Performed by: INTERNAL MEDICINE

## 2021-04-07 PROCEDURE — 99395 PR PREVENTIVE VISIT,EST,18-39: ICD-10-PCS | Mod: S$GLB,,, | Performed by: INTERNAL MEDICINE

## 2021-04-07 PROCEDURE — 3008F PR BODY MASS INDEX (BMI) DOCUMENTED: ICD-10-PCS | Mod: CPTII,S$GLB,, | Performed by: INTERNAL MEDICINE

## 2021-04-07 PROCEDURE — 99395 PREV VISIT EST AGE 18-39: CPT | Mod: S$GLB,,, | Performed by: INTERNAL MEDICINE

## 2021-04-07 PROCEDURE — 99999 PR PBB SHADOW E&M-EST. PATIENT-LVL III: ICD-10-PCS | Mod: PBBFAC,,, | Performed by: INTERNAL MEDICINE

## 2021-04-07 PROCEDURE — 1126F AMNT PAIN NOTED NONE PRSNT: CPT | Mod: S$GLB,,, | Performed by: INTERNAL MEDICINE

## 2021-04-07 PROCEDURE — 99999 PR PBB SHADOW E&M-EST. PATIENT-LVL III: CPT | Mod: PBBFAC,,, | Performed by: INTERNAL MEDICINE

## 2021-04-07 PROCEDURE — 3008F BODY MASS INDEX DOCD: CPT | Mod: CPTII,S$GLB,, | Performed by: INTERNAL MEDICINE

## 2021-04-07 RX ORDER — ESCITALOPRAM OXALATE 10 MG/1
10 TABLET ORAL DAILY
Qty: 30 TABLET | Refills: 11 | Status: SHIPPED | OUTPATIENT
Start: 2021-04-07 | End: 2022-07-15

## 2021-04-10 ENCOUNTER — TELEPHONE (OUTPATIENT)
Dept: URGENT CARE | Facility: CLINIC | Age: 26
End: 2021-04-10

## 2021-04-10 ENCOUNTER — CLINICAL SUPPORT (OUTPATIENT)
Dept: URGENT CARE | Facility: CLINIC | Age: 26
End: 2021-04-10
Payer: COMMERCIAL

## 2021-04-10 DIAGNOSIS — Z11.9 SCREENING EXAMINATION FOR UNSPECIFIED INFECTIOUS DISEASE: Primary | ICD-10-CM

## 2021-04-10 LAB
CTP QC/QA: YES
SARS-COV-2 RDRP RESP QL NAA+PROBE: NEGATIVE

## 2021-04-10 PROCEDURE — 99211 OFF/OP EST MAY X REQ PHY/QHP: CPT | Mod: S$GLB,,, | Performed by: NURSE PRACTITIONER

## 2021-04-10 PROCEDURE — 99211 PR OFFICE/OUTPT VISIT, EST, LEVL I: ICD-10-PCS | Mod: S$GLB,,, | Performed by: NURSE PRACTITIONER

## 2021-04-10 PROCEDURE — U0002 COVID-19 LAB TEST NON-CDC: HCPCS | Mod: QW,S$GLB,, | Performed by: NURSE PRACTITIONER

## 2021-04-10 PROCEDURE — U0002: ICD-10-PCS | Mod: QW,S$GLB,, | Performed by: NURSE PRACTITIONER

## 2021-05-18 ENCOUNTER — OFFICE VISIT (OUTPATIENT)
Dept: URGENT CARE | Facility: CLINIC | Age: 26
End: 2021-05-18
Payer: COMMERCIAL

## 2021-05-18 VITALS
BODY MASS INDEX: 28.72 KG/M2 | HEIGHT: 75 IN | TEMPERATURE: 99 F | SYSTOLIC BLOOD PRESSURE: 141 MMHG | HEART RATE: 85 BPM | DIASTOLIC BLOOD PRESSURE: 91 MMHG | WEIGHT: 231 LBS | OXYGEN SATURATION: 98 %

## 2021-05-18 DIAGNOSIS — R19.7 DIARRHEA, UNSPECIFIED TYPE: Primary | ICD-10-CM

## 2021-05-18 DIAGNOSIS — K52.9 GASTROENTERITIS: ICD-10-CM

## 2021-05-18 PROCEDURE — 99213 OFFICE O/P EST LOW 20 MIN: CPT | Mod: S$GLB,,, | Performed by: NURSE PRACTITIONER

## 2021-05-18 PROCEDURE — 99213 PR OFFICE/OUTPT VISIT, EST, LEVL III, 20-29 MIN: ICD-10-PCS | Mod: S$GLB,,, | Performed by: NURSE PRACTITIONER

## 2021-05-18 PROCEDURE — 3008F PR BODY MASS INDEX (BMI) DOCUMENTED: ICD-10-PCS | Mod: CPTII,S$GLB,, | Performed by: NURSE PRACTITIONER

## 2021-05-18 PROCEDURE — 3008F BODY MASS INDEX DOCD: CPT | Mod: CPTII,S$GLB,, | Performed by: NURSE PRACTITIONER

## 2022-01-05 ENCOUNTER — OFFICE VISIT (OUTPATIENT)
Dept: INTERNAL MEDICINE | Facility: CLINIC | Age: 27
End: 2022-01-05
Payer: COMMERCIAL

## 2022-01-05 VITALS
WEIGHT: 253.31 LBS | SYSTOLIC BLOOD PRESSURE: 136 MMHG | BODY MASS INDEX: 31.5 KG/M2 | HEIGHT: 75 IN | HEART RATE: 90 BPM | OXYGEN SATURATION: 98 % | DIASTOLIC BLOOD PRESSURE: 82 MMHG

## 2022-01-05 DIAGNOSIS — R07.89 CHEST WALL PAIN: Chronic | ICD-10-CM

## 2022-01-05 DIAGNOSIS — S29.011A PECTORALIS MUSCLE STRAIN, INITIAL ENCOUNTER: Primary | ICD-10-CM

## 2022-01-05 PROCEDURE — 99214 OFFICE O/P EST MOD 30 MIN: CPT | Mod: S$GLB,,, | Performed by: STUDENT IN AN ORGANIZED HEALTH CARE EDUCATION/TRAINING PROGRAM

## 2022-01-05 PROCEDURE — 3008F PR BODY MASS INDEX (BMI) DOCUMENTED: ICD-10-PCS | Mod: CPTII,S$GLB,, | Performed by: STUDENT IN AN ORGANIZED HEALTH CARE EDUCATION/TRAINING PROGRAM

## 2022-01-05 PROCEDURE — 99999 PR PBB SHADOW E&M-EST. PATIENT-LVL III: CPT | Mod: PBBFAC,,, | Performed by: STUDENT IN AN ORGANIZED HEALTH CARE EDUCATION/TRAINING PROGRAM

## 2022-01-05 PROCEDURE — 3008F BODY MASS INDEX DOCD: CPT | Mod: CPTII,S$GLB,, | Performed by: STUDENT IN AN ORGANIZED HEALTH CARE EDUCATION/TRAINING PROGRAM

## 2022-01-05 PROCEDURE — 3079F PR MOST RECENT DIASTOLIC BLOOD PRESSURE 80-89 MM HG: ICD-10-PCS | Mod: CPTII,S$GLB,, | Performed by: STUDENT IN AN ORGANIZED HEALTH CARE EDUCATION/TRAINING PROGRAM

## 2022-01-05 PROCEDURE — 3079F DIAST BP 80-89 MM HG: CPT | Mod: CPTII,S$GLB,, | Performed by: STUDENT IN AN ORGANIZED HEALTH CARE EDUCATION/TRAINING PROGRAM

## 2022-01-05 PROCEDURE — 3075F SYST BP GE 130 - 139MM HG: CPT | Mod: CPTII,S$GLB,, | Performed by: STUDENT IN AN ORGANIZED HEALTH CARE EDUCATION/TRAINING PROGRAM

## 2022-01-05 PROCEDURE — 1159F PR MEDICATION LIST DOCUMENTED IN MEDICAL RECORD: ICD-10-PCS | Mod: CPTII,S$GLB,, | Performed by: STUDENT IN AN ORGANIZED HEALTH CARE EDUCATION/TRAINING PROGRAM

## 2022-01-05 PROCEDURE — 99214 PR OFFICE/OUTPT VISIT, EST, LEVL IV, 30-39 MIN: ICD-10-PCS | Mod: S$GLB,,, | Performed by: STUDENT IN AN ORGANIZED HEALTH CARE EDUCATION/TRAINING PROGRAM

## 2022-01-05 PROCEDURE — 1159F MED LIST DOCD IN RCRD: CPT | Mod: CPTII,S$GLB,, | Performed by: STUDENT IN AN ORGANIZED HEALTH CARE EDUCATION/TRAINING PROGRAM

## 2022-01-05 PROCEDURE — 3075F PR MOST RECENT SYSTOLIC BLOOD PRESS GE 130-139MM HG: ICD-10-PCS | Mod: CPTII,S$GLB,, | Performed by: STUDENT IN AN ORGANIZED HEALTH CARE EDUCATION/TRAINING PROGRAM

## 2022-01-05 PROCEDURE — 99999 PR PBB SHADOW E&M-EST. PATIENT-LVL III: ICD-10-PCS | Mod: PBBFAC,,, | Performed by: STUDENT IN AN ORGANIZED HEALTH CARE EDUCATION/TRAINING PROGRAM

## 2022-01-05 NOTE — PROGRESS NOTES
" [facility]    Subjective:       Patient ID: Adi Siddiqui Jr. is a 26 y.o. male.    Chief Complaint: Chest Pain      History was obtained from the patient and supplemented through chart review.  This patient is normally followed by a colleague of [facility], who is unavailable today.  The patient is new to me.    HPI:    Patient is a 26 y.o. male who presents for chest wall pain    1 week of soreness (like slept in an odd position)  No SOB, no diarphoresis, not exertional  "Potential muscle strain"   Tender to touch, feels like a deep tenderness, aching. Not sharp, does not radiate anywhere  Fell on a bike on [place], lifting heavy furniture as well  No cough more than normal   Naproxen 400 mg BID helps; has been taking regularly for the past 4-5 days  Covid test rapid negative both Dec 25th and 29th  No fever, no fatigue, no body aches otherwise    Advised pepcid and hydration to alleviate AE from frequent NSAID use; pt wishes to  OTC    Smoke  4-5 years 0.5- or more a day    Medical History  Past Medical History:   Diagnosis Date    Depression     Hypertriglyceridemia     Hypothyroidism        Review of Systems   Constitutional: Negative for appetite change and diaphoresis.   HENT: Negative for trouble swallowing.    Respiratory: Negative for shortness of breath.    Cardiovascular: Positive for chest pain (chest wall pain).   Gastrointestinal: Negative for diarrhea, nausea and vomiting.   Musculoskeletal: Negative for gait problem.   Neurological: Negative for dizziness and seizures.   Psychiatric/Behavioral: Negative for hallucinations.         Surgical hx, family hx, social hx   Have been reviewed      Current Outpatient Medications:     EScitalopram oxalate (LEXAPRO) 10 MG tablet, Take 1 tablet (10 mg total) by mouth once daily., Disp: 30 tablet, Rfl: 11    fenofibrate (TRICOR) 48 MG tablet, Take 1 tablet (48 mg total) by mouth once daily., Disp: 90 tablet, Rfl: 4    " "levothyroxine (SYNTHROID, LEVOTHROID) 175 MCG tablet, Take 1 tablet (175 mcg total) by mouth once daily., Disp: 90 tablet, Rfl: 3    Objective:        Body mass index is 31.66 kg/m².  Vitals:    01/05/22 1522   BP: 136/82   Pulse: 90   SpO2: 98%   Weight: 114.9 kg (253 lb 4.9 oz)   Height: 6' 3" (1.905 m)   PainSc:   4   PainLoc: Chest     Physical Exam  Vitals and nursing note reviewed.   Constitutional:       General: He is not in acute distress.     Appearance: Normal appearance. He is not ill-appearing.   HENT:      Head: Normocephalic and atraumatic.      Nose:      Comments: Wearing a mask  Eyes:      General: No scleral icterus.  Cardiovascular:      Rate and Rhythm: Normal rate and regular rhythm.      Heart sounds: Normal heart sounds.      Comments: Deep TTP muscular over left pectoralis  Pulmonary:      Effort: Pulmonary effort is normal.   Abdominal:      General: There is no distension.   Musculoskeletal:         General: No deformity.      Cervical back: Normal range of motion.   Skin:     General: Skin is warm and dry.   Neurological:      Mental Status: He is alert and oriented to person, place, and time.   Psychiatric:         Behavior: Behavior normal.           Lab Results   Component Value Date    WBC 7.64 02/22/2019    HGB 15.1 02/22/2019    HCT 44.3 02/22/2019     02/22/2019    CHOL 155 02/19/2021    TRIG 249 (H) 02/19/2021    HDL 30 (L) 02/19/2021    ALT 33 02/19/2021    AST 20 02/19/2021     02/19/2021    K 3.9 02/19/2021     02/19/2021    CREATININE 0.8 02/19/2021    BUN 10 02/19/2021    CO2 23 02/19/2021    TSH 2.740 02/19/2021     Revieded lipids, elevated TGs      The ASCVD Risk score (Amelia LESLEE Jr., et al., 2013) failed to calculate for the following reasons:    The 2013 ASCVD risk score is only valid for ages 40 to 79    (Imaging have been independently reviewed)      Assessment:         1. Pectoralis muscle strain, initial encounter    2. Chest wall pain          Plan: "     Adi was seen today for chest pain.    Diagnoses and all orders for this visit:    Pectoralis muscle strain, initial encounter  Comments:  Low suspcion for cardia or GI etiology  Cont NSAIDS  Precautions given if any change or worsens     Chest wall pain      Reviewed labs, considered family history and recent covid risk factors.  Suspect muscle strain.      Follow up if symptoms worsen or fail to improve.        All medications were reviewed including potential side effects and risks/benefits.  Pt was counseled to call back if anything worsens or if questions arise.    Cory Andrade MD  Family Medicine  [facility]  [facility] [address]  Phone [phone]  Fax [phone]

## 2022-03-23 ENCOUNTER — PATIENT MESSAGE (OUTPATIENT)
Dept: ENDOCRINOLOGY | Facility: CLINIC | Age: 27
End: 2022-03-23
Payer: COMMERCIAL

## 2022-03-23 ENCOUNTER — PATIENT MESSAGE (OUTPATIENT)
Dept: INTERNAL MEDICINE | Facility: CLINIC | Age: 27
End: 2022-03-23
Payer: COMMERCIAL

## 2022-03-23 DIAGNOSIS — E03.9 ACQUIRED HYPOTHYROIDISM: Primary | ICD-10-CM

## 2022-03-23 RX ORDER — LEVOTHYROXINE SODIUM 175 UG/1
175 TABLET ORAL DAILY
Qty: 90 TABLET | Refills: 3 | Status: SHIPPED | OUTPATIENT
Start: 2022-03-23 | End: 2022-03-23 | Stop reason: SDUPTHER

## 2022-03-23 RX ORDER — LEVOTHYROXINE SODIUM 175 UG/1
175 TABLET ORAL DAILY
Qty: 90 TABLET | Refills: 3 | Status: SHIPPED | OUTPATIENT
Start: 2022-03-23 | End: 2023-01-17 | Stop reason: SDUPTHER

## 2022-03-23 RX ORDER — LEVOTHYROXINE SODIUM 175 UG/1
175 TABLET ORAL DAILY
Qty: 90 TABLET | Refills: 3 | Status: CANCELLED | OUTPATIENT
Start: 2022-03-23

## 2022-03-23 NOTE — TELEPHONE ENCOUNTER
Care Due:                  Date            Visit Type   Department     Provider  --------------------------------------------------------------------------------                                MYCHART                              ANNUAL                              CHECKUP/PHY  [facility] INTERNAL  Last Visit: 04-      [facility]       Archie  Sheeba  Next Visit: None Scheduled  None         None Found                                                            Last  Test          Frequency    Reason                     Performed    Due Date  --------------------------------------------------------------------------------    Office Visit  12 months..  EScitalopram, fenofibrate  04- 04-    CBC.........  12 months..  fenofibrate..............  Not Found    Overdue    CMP.........  12 months..  fenofibrate..............  02- 02-    Lipid Panel.  12 months..  fenofibrate..............  02- 02-    Powered by [employer]. Reference number: [id number].   3/23/2022 10:40:43 AM CDT

## 2022-07-15 ENCOUNTER — LAB VISIT (OUTPATIENT)
Dept: LAB | Facility: OTHER | Age: 27
End: 2022-07-15
Attending: STUDENT IN AN ORGANIZED HEALTH CARE EDUCATION/TRAINING PROGRAM
Payer: COMMERCIAL

## 2022-07-15 ENCOUNTER — OFFICE VISIT (OUTPATIENT)
Dept: INTERNAL MEDICINE | Facility: CLINIC | Age: 27
End: 2022-07-15
Payer: COMMERCIAL

## 2022-07-15 VITALS
OXYGEN SATURATION: 97 % | HEART RATE: 80 BPM | HEIGHT: 73 IN | DIASTOLIC BLOOD PRESSURE: 88 MMHG | WEIGHT: 253.5 LBS | SYSTOLIC BLOOD PRESSURE: 132 MMHG | BODY MASS INDEX: 33.6 KG/M2

## 2022-07-15 DIAGNOSIS — Z11.4 SCREENING FOR HIV (HUMAN IMMUNODEFICIENCY VIRUS): ICD-10-CM

## 2022-07-15 DIAGNOSIS — E04.9 ENLARGED THYROID: ICD-10-CM

## 2022-07-15 DIAGNOSIS — Z11.59 NEED FOR HEPATITIS C SCREENING TEST: ICD-10-CM

## 2022-07-15 DIAGNOSIS — F32.A DEPRESSION, UNSPECIFIED DEPRESSION TYPE: ICD-10-CM

## 2022-07-15 DIAGNOSIS — E66.01 MORBID OBESITY: ICD-10-CM

## 2022-07-15 DIAGNOSIS — E03.9 HYPOTHYROIDISM, UNSPECIFIED TYPE: ICD-10-CM

## 2022-07-15 DIAGNOSIS — Z00.00 ANNUAL PHYSICAL EXAM: Primary | ICD-10-CM

## 2022-07-15 DIAGNOSIS — E78.1 FAMILIAL HYPERTRIGLYCERIDEMIA: ICD-10-CM

## 2022-07-15 DIAGNOSIS — E78.1 HYPERTRIGLYCERIDEMIA: ICD-10-CM

## 2022-07-15 DIAGNOSIS — R74.01 ELEVATED ALT MEASUREMENT: ICD-10-CM

## 2022-07-15 DIAGNOSIS — Z00.00 ANNUAL PHYSICAL EXAM: ICD-10-CM

## 2022-07-15 DIAGNOSIS — D64.9 ANEMIA, UNSPECIFIED TYPE: ICD-10-CM

## 2022-07-15 LAB
ALBUMIN SERPL BCP-MCNC: 4.3 G/DL (ref 3.5–5.2)
ALP SERPL-CCNC: 73 U/L (ref 55–135)
ALT SERPL W/O P-5'-P-CCNC: 71 U/L (ref 10–44)
ANION GAP SERPL CALC-SCNC: 11 MMOL/L (ref 8–16)
AST SERPL-CCNC: 37 U/L (ref 10–40)
BASOPHILS # BLD AUTO: 0.05 K/UL (ref 0–0.2)
BASOPHILS NFR BLD: 0.7 % (ref 0–1.9)
BILIRUB SERPL-MCNC: 0.6 MG/DL (ref 0.1–1)
BUN SERPL-MCNC: 10 MG/DL (ref 6–20)
CALCIUM SERPL-MCNC: 10.1 MG/DL (ref 8.7–10.5)
CHLORIDE SERPL-SCNC: 105 MMOL/L (ref 95–110)
CHOLEST SERPL-MCNC: 205 MG/DL (ref 120–199)
CHOLEST/HDLC SERPL: 6 {RATIO} (ref 2–5)
CO2 SERPL-SCNC: 25 MMOL/L (ref 23–29)
CREAT SERPL-MCNC: 0.8 MG/DL (ref 0.5–1.4)
DIFFERENTIAL METHOD: ABNORMAL
EOSINOPHIL # BLD AUTO: 0.3 K/UL (ref 0–0.5)
EOSINOPHIL NFR BLD: 4.9 % (ref 0–8)
ERYTHROCYTE [DISTWIDTH] IN BLOOD BY AUTOMATED COUNT: 11.9 % (ref 11.5–14.5)
EST. GFR  (AFRICAN AMERICAN): >60 ML/MIN/1.73 M^2
EST. GFR  (NON AFRICAN AMERICAN): >60 ML/MIN/1.73 M^2
ESTIMATED AVG GLUCOSE: 91 MG/DL (ref 68–131)
GLUCOSE SERPL-MCNC: 93 MG/DL (ref 70–110)
HBA1C MFR BLD: 4.8 % (ref 4–5.6)
HCT VFR BLD AUTO: 44.7 % (ref 40–54)
HDLC SERPL-MCNC: 34 MG/DL (ref 40–75)
HDLC SERPL: 16.6 % (ref 20–50)
HGB BLD-MCNC: 16 G/DL (ref 14–18)
IMM GRANULOCYTES # BLD AUTO: 0.02 K/UL (ref 0–0.04)
IMM GRANULOCYTES NFR BLD AUTO: 0.3 % (ref 0–0.5)
LDLC SERPL CALC-MCNC: ABNORMAL MG/DL (ref 63–159)
LYMPHOCYTES # BLD AUTO: 2.2 K/UL (ref 1–4.8)
LYMPHOCYTES NFR BLD: 31.7 % (ref 18–48)
MCH RBC QN AUTO: 31.4 PG (ref 27–31)
MCHC RBC AUTO-ENTMCNC: 35.8 G/DL (ref 32–36)
MCV RBC AUTO: 88 FL (ref 82–98)
MONOCYTES # BLD AUTO: 0.5 K/UL (ref 0.3–1)
MONOCYTES NFR BLD: 7.2 % (ref 4–15)
NEUTROPHILS # BLD AUTO: 3.8 K/UL (ref 1.8–7.7)
NEUTROPHILS NFR BLD: 55.2 % (ref 38–73)
NONHDLC SERPL-MCNC: 171 MG/DL
NRBC BLD-RTO: 0 /100 WBC
PLATELET # BLD AUTO: 217 K/UL (ref 150–450)
PMV BLD AUTO: 9.4 FL (ref 9.2–12.9)
POTASSIUM SERPL-SCNC: 4.4 MMOL/L (ref 3.5–5.1)
PROT SERPL-MCNC: 7.6 G/DL (ref 6–8.4)
RBC # BLD AUTO: 5.09 M/UL (ref 4.6–6.2)
SODIUM SERPL-SCNC: 141 MMOL/L (ref 136–145)
TRIGL SERPL-MCNC: 660 MG/DL (ref 30–150)
TSH SERPL DL<=0.005 MIU/L-ACNC: 3.06 UIU/ML (ref 0.4–4)
WBC # BLD AUTO: 6.79 K/UL (ref 3.9–12.7)

## 2022-07-15 PROCEDURE — 3075F PR MOST RECENT SYSTOLIC BLOOD PRESS GE 130-139MM HG: ICD-10-PCS | Mod: CPTII,S$GLB,, | Performed by: STUDENT IN AN ORGANIZED HEALTH CARE EDUCATION/TRAINING PROGRAM

## 2022-07-15 PROCEDURE — 99999 PR PBB SHADOW E&M-EST. PATIENT-LVL III: ICD-10-PCS | Mod: PBBFAC,,, | Performed by: STUDENT IN AN ORGANIZED HEALTH CARE EDUCATION/TRAINING PROGRAM

## 2022-07-15 PROCEDURE — 84443 ASSAY THYROID STIM HORMONE: CPT | Performed by: STUDENT IN AN ORGANIZED HEALTH CARE EDUCATION/TRAINING PROGRAM

## 2022-07-15 PROCEDURE — 85025 COMPLETE CBC W/AUTO DIFF WBC: CPT | Performed by: STUDENT IN AN ORGANIZED HEALTH CARE EDUCATION/TRAINING PROGRAM

## 2022-07-15 PROCEDURE — 83036 HEMOGLOBIN GLYCOSYLATED A1C: CPT | Performed by: STUDENT IN AN ORGANIZED HEALTH CARE EDUCATION/TRAINING PROGRAM

## 2022-07-15 PROCEDURE — 3008F PR BODY MASS INDEX (BMI) DOCUMENTED: ICD-10-PCS | Mod: CPTII,S$GLB,, | Performed by: STUDENT IN AN ORGANIZED HEALTH CARE EDUCATION/TRAINING PROGRAM

## 2022-07-15 PROCEDURE — 80061 LIPID PANEL: CPT | Performed by: STUDENT IN AN ORGANIZED HEALTH CARE EDUCATION/TRAINING PROGRAM

## 2022-07-15 PROCEDURE — 86803 HEPATITIS C AB TEST: CPT | Performed by: STUDENT IN AN ORGANIZED HEALTH CARE EDUCATION/TRAINING PROGRAM

## 2022-07-15 PROCEDURE — 3075F SYST BP GE 130 - 139MM HG: CPT | Mod: CPTII,S$GLB,, | Performed by: STUDENT IN AN ORGANIZED HEALTH CARE EDUCATION/TRAINING PROGRAM

## 2022-07-15 PROCEDURE — 99999 PR PBB SHADOW E&M-EST. PATIENT-LVL III: CPT | Mod: PBBFAC,,, | Performed by: STUDENT IN AN ORGANIZED HEALTH CARE EDUCATION/TRAINING PROGRAM

## 2022-07-15 PROCEDURE — 3079F PR MOST RECENT DIASTOLIC BLOOD PRESSURE 80-89 MM HG: ICD-10-PCS | Mod: CPTII,S$GLB,, | Performed by: STUDENT IN AN ORGANIZED HEALTH CARE EDUCATION/TRAINING PROGRAM

## 2022-07-15 PROCEDURE — 80053 COMPREHEN METABOLIC PANEL: CPT | Performed by: STUDENT IN AN ORGANIZED HEALTH CARE EDUCATION/TRAINING PROGRAM

## 2022-07-15 PROCEDURE — 3008F BODY MASS INDEX DOCD: CPT | Mod: CPTII,S$GLB,, | Performed by: STUDENT IN AN ORGANIZED HEALTH CARE EDUCATION/TRAINING PROGRAM

## 2022-07-15 PROCEDURE — 99395 PR PREVENTIVE VISIT,EST,18-39: ICD-10-PCS | Mod: S$GLB,,, | Performed by: STUDENT IN AN ORGANIZED HEALTH CARE EDUCATION/TRAINING PROGRAM

## 2022-07-15 PROCEDURE — 87389 HIV-1 AG W/HIV-1&-2 AB AG IA: CPT | Performed by: STUDENT IN AN ORGANIZED HEALTH CARE EDUCATION/TRAINING PROGRAM

## 2022-07-15 PROCEDURE — 36415 COLL VENOUS BLD VENIPUNCTURE: CPT | Performed by: STUDENT IN AN ORGANIZED HEALTH CARE EDUCATION/TRAINING PROGRAM

## 2022-07-15 PROCEDURE — 1159F PR MEDICATION LIST DOCUMENTED IN MEDICAL RECORD: ICD-10-PCS | Mod: CPTII,S$GLB,, | Performed by: STUDENT IN AN ORGANIZED HEALTH CARE EDUCATION/TRAINING PROGRAM

## 2022-07-15 PROCEDURE — 99395 PREV VISIT EST AGE 18-39: CPT | Mod: S$GLB,,, | Performed by: STUDENT IN AN ORGANIZED HEALTH CARE EDUCATION/TRAINING PROGRAM

## 2022-07-15 PROCEDURE — 1159F MED LIST DOCD IN RCRD: CPT | Mod: CPTII,S$GLB,, | Performed by: STUDENT IN AN ORGANIZED HEALTH CARE EDUCATION/TRAINING PROGRAM

## 2022-07-15 PROCEDURE — 3079F DIAST BP 80-89 MM HG: CPT | Mod: CPTII,S$GLB,, | Performed by: STUDENT IN AN ORGANIZED HEALTH CARE EDUCATION/TRAINING PROGRAM

## 2022-07-15 NOTE — PROGRESS NOTES
[facility]    Subjective:       Patient ID: Adi Siddiqui Jr. is a 26 y.o. male.    Chief Complaint: Annual Exam      History was obtained from the patient and supplemented through chart review.  This patient is known to me.      HPI:    Patient is a 26 y.o. male who presents for annual.    Stomach pain  Nausea, sharp pain, D/C  Some small amount of blood with straining on TP  Discussed metamucil BID initially and then daily, ok to add miralax  Precautions given    Hypothyroid  significant hypertriglyceridemia in past  Stable 175 mcg  recheck    Elevated ALT  Recheck  Advice given in portal to decrease ETOH    Borderline elevated DBP  monitor    Smoke  4-5 years 0.5- or more a day  Counseled on cessation    Depression  In remission without meds  Used to take lexapro, stopped 2/2 sexual side effects     Medical History  Past Medical History:   Diagnosis Date    Depression     Hypertriglyceridemia     Hypothyroidism        Review of Systems   Constitutional: Negative for appetite change and diaphoresis.   HENT: Negative for trouble swallowing.    Respiratory: Negative for shortness of breath.    Cardiovascular: Negative for chest pain.   Gastrointestinal: Positive for abdominal pain, constipation, diarrhea and nausea. Negative for vomiting.   Musculoskeletal: Negative for gait problem.   Neurological: Negative for dizziness and seizures.   Psychiatric/Behavioral: Negative for hallucinations.         Surgical hx, family hx, social hx   Have been reviewed      Current Outpatient Medications:     fenofibrate (TRICOR) 48 MG tablet, Take 1 tablet (48 mg total) by mouth once daily., Disp: 90 tablet, Rfl: 4    levothyroxine (SYNTHROID, LEVOTHROID) 175 MCG tablet, Take 1 tablet (175 mcg total) by mouth once daily., Disp: 90 tablet, Rfl: 3    Objective:        Body mass index is 33.45 kg/m².  Vitals:    07/15/22 1043   BP: 132/88   Pulse: 80   SpO2: 97%   Weight: 115 kg (253 lb 8.5 oz)   Height: 6'  "1" (1.854 m)   PainSc: 0-No pain     Physical Exam  Vitals and nursing note reviewed.   Constitutional:       General: He is not in acute distress.     Appearance: Normal appearance. He is not ill-appearing.   HENT:      Head: Normocephalic and atraumatic.      Nose:      Comments: Wearing a mask  Eyes:      General: No scleral icterus.  Cardiovascular:      Rate and Rhythm: Normal rate and regular rhythm.      Heart sounds: Normal heart sounds.   Pulmonary:      Effort: Pulmonary effort is normal.   Abdominal:      General: There is no distension.   Musculoskeletal:         General: No deformity.      Cervical back: Normal range of motion.   Skin:     General: Skin is warm and dry.   Neurological:      Mental Status: He is alert and oriented to person, place, and time.   Psychiatric:         Behavior: Behavior normal.           Lab Results   Component Value Date    WBC 6.79 07/15/2022    HGB 16.0 07/15/2022    HCT 44.7 07/15/2022     07/15/2022    CHOL 155 02/19/2021    TRIG 249 (H) 02/19/2021    HDL 30 (L) 02/19/2021    ALT 71 (H) 07/15/2022    AST 37 07/15/2022     07/15/2022    K 4.4 07/15/2022     07/15/2022    CREATININE 0.8 07/15/2022    BUN 10 07/15/2022    CO2 25 07/15/2022    TSH 3.065 07/15/2022     Revieded lipids, elevated TGs      The ASCVD Risk score (Amelia DC Jr., et al., 2013) failed to calculate for the following reasons:    The 2013 ASCVD risk score is only valid for ages 40 to 79    (Imaging have been independently reviewed)      Assessment:         1. Annual physical exam    2. Anemia, unspecified type    3. Hypertriglyceridemia    4. Familial hypertriglyceridemia    5. Morbid obesity    6. Depression, unspecified depression type    7. Hypothyroidism, unspecified type    8. Screening for HIV (human immunodeficiency virus)    9. Need for hepatitis C screening test    10. Enlarged thyroid    11. Elevated ALT measurement          Plan:     Adi was seen today for annual " exam.    Diagnoses and all orders for this visit:    Annual physical exam  -     Comprehensive Metabolic Panel; Future  -     Lipid Panel; Future  -     TSH; Future  -     CBC Auto Differential; Future  -     Hemoglobin A1C; Future    Anemia, unspecified type    Hypertriglyceridemia    Familial hypertriglyceridemia    Morbid obesity    Depression, unspecified depression type    Hypothyroidism, unspecified type  -     TSH; Future    Screening for HIV (human immunodeficiency virus)  -     HIV 1/2 Ag/Ab (4th Gen); Future    Need for hepatitis C screening test  -     Hepatitis C Antibody; Future    Enlarged thyroid  -     US Soft Tissue Head Neck Thyroid; Future    Elevated ALT measurement  -     Comprehensive Metabolic Panel; Future         Follow up in about 6 months (around 1/15/2023).        All medications were reviewed including potential side effects and risks/benefits.  Pt was counseled to call back if anything worsens or if questions arise.    Cory Andrade MD  Family Medicine  [facility]  [address] [place] [address]  Phone [phone]  Fax [phone]

## 2022-07-17 DIAGNOSIS — E78.1 HYPERTRIGLYCERIDEMIA: Primary | ICD-10-CM

## 2022-07-17 DIAGNOSIS — R74.01 TRANSAMINITIS: ICD-10-CM

## 2022-07-17 DIAGNOSIS — E78.1 FAMILIAL HYPERTRIGLYCERIDEMIA: ICD-10-CM

## 2022-07-17 RX ORDER — FENOFIBRATE 48 MG/1
48 TABLET, FILM COATED ORAL DAILY
Qty: 90 TABLET | Refills: 4 | Status: SHIPPED | OUTPATIENT
Start: 2022-07-17 | End: 2023-07-31 | Stop reason: SDUPTHER

## 2022-07-18 ENCOUNTER — TELEPHONE (OUTPATIENT)
Dept: INTERNAL MEDICINE | Facility: CLINIC | Age: 27
End: 2022-07-18
Payer: COMMERCIAL

## 2022-07-18 LAB
HCV AB SERPL QL IA: NEGATIVE
HIV 1+2 AB+HIV1 P24 AG SERPL QL IA: NEGATIVE

## 2022-07-23 ENCOUNTER — HOSPITAL ENCOUNTER (OUTPATIENT)
Dept: RADIOLOGY | Facility: OTHER | Age: 27
Discharge: HOME | End: 2022-07-23
Attending: STUDENT IN AN ORGANIZED HEALTH CARE EDUCATION/TRAINING PROGRAM
Payer: COMMERCIAL

## 2022-07-23 DIAGNOSIS — R74.01 TRANSAMINITIS: ICD-10-CM

## 2022-07-23 PROCEDURE — 76700 US EXAM ABDOM COMPLETE: CPT | Mod: TC

## 2022-07-23 PROCEDURE — 76700 US ABDOMEN COMPLETE: ICD-10-PCS | Mod: 26,,, | Performed by: RADIOLOGY

## 2022-07-23 PROCEDURE — 76700 US EXAM ABDOM COMPLETE: CPT | Mod: 26,,, | Performed by: RADIOLOGY

## 2022-08-03 ENCOUNTER — CLINICAL SUPPORT (OUTPATIENT)
Dept: NUTRITION | Facility: CLINIC | Age: 27
End: 2022-08-03
Payer: COMMERCIAL

## 2022-08-03 DIAGNOSIS — E78.1 FAMILIAL HYPERTRIGLYCERIDEMIA: ICD-10-CM

## 2022-08-03 DIAGNOSIS — E78.1 HYPERTRIGLYCERIDEMIA: ICD-10-CM

## 2022-08-03 PROCEDURE — 97802 PR MED NUTR THER, 1ST, INDIV, EA 15 MIN: ICD-10-PCS | Mod: 95,,, | Performed by: NUTRITIONIST

## 2022-08-03 PROCEDURE — 97802 MEDICAL NUTRITION INDIV IN: CPT | Mod: 95,,, | Performed by: NUTRITIONIST

## 2022-08-03 NOTE — PROGRESS NOTES
"Nutrition Assessment for Initial Medical Nutrition Therapy  The patient location is: [place]  The chief complaint leading to consultation is: [place]    Visit type: audiovisual    Face to Face time with patient: 90 minutes of total time spent on the encounter, which includes face to face time and non-face to face time preparing to see the patient (eg, review of tests), Obtaining and/or reviewing separately obtained history, Documenting clinical information in the electronic or other health record, Independently interpreting results (not separately reported) and communicating results to the patient/family/caregiver, or Care coordination (not separately reported).         Each patient to whom he or she provides medical services by telemedicine is:  (1) informed of the relationship between the physician and patient and the respective role of any other health care provider with respect to management of the patient; and (2) notified that he or she may decline to receive medical services by telemedicine and may withdraw from such care at any time.      Reason for MNT visit: Pt in for education and nutrition counseling regarding hypertriglyceridemia       ASSESSMENT    Age: 27 y.o.  Wt: 250 lbs  Wt Readings from Last 1 Encounters:   07/15/22 115 kg (253 lb 8.5 oz)     Ht: 6'1"  Ht Readings from Last 1 Encounters:   07/15/22 6' 1" (1.854 m)     BMI: 33.4  BMI Readings from Last 1 Encounters:   07/15/22 33.45 kg/m²       Clinical Signs/Symptoms: patient stated he wants to lose weight and get his TG and ALT WNL.    Medical History:   Past Medical History:   Diagnosis Date    Depression     Hypertriglyceridemia     Hypothyroidism      Problem List           Resolved    Nasal obstruction         Acquired hypothyroidism         Hypertriglyceridemia         Familial hypertriglyceridemia         Depression         Morbid obesity         Pectoralis muscle strain               Medications:   Current Outpatient " Medications:     fenofibrate (TRICOR) 48 MG tablet, Take 1 tablet (48 mg total) by mouth once daily., Disp: 90 tablet, Rfl: 4    levothyroxine (SYNTHROID, LEVOTHROID) 175 MCG tablet, Take 1 tablet (175 mcg total) by mouth once daily., Disp: 90 tablet, Rfl: 3    Food allergies  Intolerances: NKFA, but possibly a dairy intolerance/sensitivity per patient. Cheese in large amounts can make him not feel well    Labs:  Reviewed and noted  Hemoglobin A1C   Date Value Ref Range Status   07/15/2022 4.8 4.0 - 5.6 % Final     Comment:     ADA Screening Guidelines:  5.7-6.4%  Consistent with prediabetes  >or=6.5%  Consistent with diabetes    High levels of fetal hemoglobin interfere with the HbA1C  assay. Heterozygous hemoglobin variants (HbS, HgC, etc)do  not significantly interfere with this assay.   However, presence of multiple variants may affect accuracy.       Cholesterol   Date Value Ref Range Status   07/15/2022 205 (H) 120 - 199 mg/dL Final     Comment:     The National Cholesterol Education Program (NCEP) has set the  following guidelines (reference ranges) for Cholesterol:  Optimal.....................<200 mg/dL  Borderline High.............200-239 mg/dL  High........................> or = 240 mg/dL     02/19/2021 155 120 - 199 mg/dL Final     Comment:     The National Cholesterol Education Program (NCEP) has set the  following guidelines (reference ranges) for Cholesterol:  Optimal.....................<200 mg/dL  Borderline High.............200-239 mg/dL  High........................> or = 240 mg/dL     03/09/2020 161 120 - 199 mg/dL Final     Comment:     The National Cholesterol Education Program (NCEP) has set the  following guidelines (reference ranges) for Cholesterol:  Optimal.....................<200 mg/dL  Borderline High.............200-239 mg/dL  High........................> or = 240 mg/dL       Triglycerides   Date Value Ref Range Status   07/15/2022 660 (H) 30 - 150 mg/dL Final     Comment:     The  National Cholesterol Education Program (NCEP) has set the  following guidelines (reference values) for triglycerides:  Normal......................<150 mg/dL  Borderline High.............150-199 mg/dL  High........................200-499 mg/dL     02/19/2021 249 (H) 30 - 150 mg/dL Final     Comment:     The National Cholesterol Education Program (NCEP) has set the  following guidelines (reference values) for triglycerides:  Normal......................<150 mg/dL  Borderline High.............150-199 mg/dL  High........................200-499 mg/dL     03/09/2020 392 (H) 30 - 150 mg/dL Final     Comment:     The National Cholesterol Education Program (NCEP) has set the  following guidelines (reference values) for triglycerides:  Normal......................<150 mg/dL  Borderline High.............150-199 mg/dL  High........................200-499 mg/dL       HDL   Date Value Ref Range Status   07/15/2022 34 (L) 40 - 75 mg/dL Final     Comment:     The National Cholesterol Education Program (NCEP) has set the  following guidelines (reference values) for HDL Cholesterol:  Low...............<40 mg/dL  Optimal...........>60 mg/dL     02/19/2021 30 (L) 40 - 75 mg/dL Final     Comment:     The National Cholesterol Education Program (NCEP) has set the  following guidelines (reference values) for HDL Cholesterol:  Low...............<40 mg/dL  Optimal...........>60 mg/dL     03/09/2020 35 (L) 40 - 75 mg/dL Final     Comment:     The National Cholesterol Education Program (NCEP) has set the  following guidelines (reference values) for HDL Cholesterol:  Low...............<40 mg/dL  Optimal...........>60 mg/dL       LDL Cholesterol   Date Value Ref Range Status   07/15/2022 Invalid, Trig>400.0 63.0 - 159.0 mg/dL Final     Comment:     The National Cholesterol Education Program (NCEP) has set the  following guidelines (reference values) for LDL Cholesterol:  Optimal.......................<130 mg/dL  Borderline  High...............130-159 mg/dL  High..........................160-189 mg/dL  Very High.....................>190 mg/dL     02/19/2021 75.2 63.0 - 159.0 mg/dL Final     Comment:     The National Cholesterol Education Program (NCEP) has set the  following guidelines (reference values) for LDL Cholesterol:  Optimal.......................<130 mg/dL  Borderline High...............130-159 mg/dL  High..........................160-189 mg/dL  Very High.....................>190 mg/dL     03/09/2020 47.6 (L) 63.0 - 159.0 mg/dL Final     Comment:     The National Cholesterol Education Program (NCEP) has set the  following guidelines (reference values) for LDL Cholesterol:  Optimal.......................<130 mg/dL  Borderline High...............130-159 mg/dL  High..........................160-189 mg/dL  Very High.....................>190 mg/dL         Typical day recall: Reviewed and noted during consult     Beverages: 1-2 cans coke per week; 8 bottles water daily; coffee daily with splash of cream    Dining out: Regular, 6 or more times per week    Alcohol: smoker and drinks 2-3 days per week (5-6 beers each time)    Lifestyle Influences  Support System: his girlfriend    Meal preparation/shopping: girlfriend    Current Activity Level: 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 (on a normal day he can easily get 20,000 steps per day; on a 'lazy' work day he can get 10,000 steps daily    Patient motivation, anticipated barriers, expected compliance: Patient is motivated and has verbalized understanding and intent to comply     DIAGNOSIS   Problem: Excessive Energy Intake  Etiology: RT eating too many calories  Signs/Symptoms: AEB 24 hour recall and BMI    INTERVENTION  Nutrition prescription: estimated energy requirements:   Calories: 2,700  Protein: 180-210 grams  Carbohydrates: 160-180 grams  Fats: choose plant-based heart healthy fats  Total fluid: 120 oz + sweat loss  Limit added sugar to 20 grams or less per day (Note:  1 teaspoon of sugar = ~5 gram)      Recommendations & Goals:  Patient goals and recommendations are tailored to the specific patient's needs, readiness to change, lifestyle, culture, skills, resources, & abilities. Strategies to help achieve these nutrition-related goals were discussed which can include but are not limited to SMART goal setting & mindful eating.     Aim for a minimum of 7 hours sleep   Exercise 60 minutes most days  Eat breakfast within 1-2 hours of waking up  Try not to skip any meals or snacks, not going more than 3-4 hours without eating.   At each meal and snack, try to include a source of fiber + lean protein + healthy fat.       Written Materials Provided  These resources are intended to assist the patient in making it easier to choose recommended options when eating out & to identify better-for-you brands at the grocery store:     Meal Planning Guide with recommendations discussed along with portion sizes and a meal plan    Fueling Well On The Go Guide   Eat Fit Grocery Product list    RD contact information- for patient to contact regarding any questions, needs, and/or concerns that may arise     MONITORING & EVALUATION    Communicated with healthcare provider    Documented plan for referral to appropriate agency/healthcare provider as needed    Comprehension: good     Motivation to change: high    Follow-up: 5 weeks or prn    Counseling time: 1 Hour 45 Minutes

## 2022-08-03 NOTE — PATIENT INSTRUCTIONS
Name: Adi Siddiqui  Date: 8.2.2022    Daily Energy Requirements:  Calories: 2,700  Protein: 180-210 grams  Carbohydrates: 160-180 grams  Fats: choose plant-based heart healthy fats  Total fluid: 120 oz + sweat loss  Limit added sugar to 20 grams or less per day (Note: 1 teaspoon of sugar = ~5 gram)    Breakfast: 2 servings of carbs = 30-40 grams + at least 20 grams lean protein/heart healthy fat  Flavored Greek yogurt (the one your doing is fine. It's a bit more sugar, but see notes below for flavored Greek yogurt brands that are lower in sugar) + protein bar (See notes for brand examples)  2 whole eggs + ½ avocado + 2 slices colbert on 100% whole wheat tortilla    Snack: A mid-morning snack may be needed if going >3-4 hours between meals      Lunch: 6 oz lean protein + unlimited non-starchy veggies + 2 servings of carbs = 30-40 grams  Examples of 2 servings of carbohydrates = 30-40 grams:  1 cup cooked pasta (see notes brand examples); 2/3 cup cooked brown rice; 1 medium potato or sweet potato (5-6 oz in weight); 1 medium size piece of fruit + 2 slices Michi's Killer thin sliced bread; 1 serving of whole grain chips (see notes) + 2 slices thin sliced whole grain bread; 2 slices 100-calorie bread; 1 serving of Beanito chips + ½ cup cooked whole grain starch; 1 cup cooked beans; ½ cup cooked beans + 1/3 cup cooked brown rice, etc  The rest of your plate will consist of 6 oz lean protein + unlimited non-starchy veggies  Note: if the protein comes with a cream sauce, try getting sauce on the side and add 2 tablespoons. If already coated, then scrape off extra that is more than lightly coated  Meal Examples:  Salad: Unlimited non-starchy veggies + 6 oz lean protein + 2-3 salad add-ins (see notes below) + 1 cup fruit  Ralston: 2 slices Michi's Killer 21 whole grains and seeds regular sliced bread (or pick the bread that is closest to whole grain at work) + 6 oz turkey/ham + 2 tablespoons regular esparza + 1 slice  cheese + non-starchy veggies of your choice  Note: If you want a piece of fruit or 1 serving of whole grain chips with your sandwich, then do 2 slices Michi's Killer thin-sliced bread  Spaghetti & meat sauce: 1 cup cooked 100% whole wheat pasta -or- 1.5 cups cooked lentil pasta + 6 oz 93/7 lean ground beef + side of non-starchy veggies  6 oz lean protein + non-starchy veggies + 1 medium potato (5-6 oz in weight) to make Airfried French fries  Restaurants: See tips in notes, particularly the Pick 1 out of the 4 rule  Fast Food: See FUELING WELL on-the-go guide     Snack: ~1 serving of carbs = 15-25 grams + at least 20 grams lean protein/heart healthy fat + unlimited amounts of non-starchy vegetables  Flavored Greek yogurt (See notes for brands) x 2  [place] balance break + piece of fruit + ¼ cup nuts  1/3 cup nuts + piece of fruit  PB&J: 2 slices 100% whole grain bread + 2-3 tablespoons peanut butter + 1-2 tablespoons no sugar added (NSA) jelly (See notes for brand)  Piece of fruit + 2 tablespoons regular nut butter (The Jiff to go packets are fun and are easy to travel with)  2 servings of Beanito chips + 2, 100-calorie wholly guacamole packet  1 serving of Beanito chips + 1/3 cup shredded cheese melted on top + ½ avocado (aka better-for-you alena's)  Flapjacked protein mighty muffin (found on baking aisle of grocery stores--the chocolate is delicious) + protein bar  Granola/protein bar (see notes for brands) + 1/3 cup nuts  Avocado toast: ½ small Owens avocado on 1 slice 100% whole grain toast or rice cake + side of fruit  Non-starchy veggies with guacamole and hummus + half snack bag of nuts  ½ sandwich from lunch  If doing baked chips or veggie chips at work, then have a protein and fat with the meal (protein bar, Greek yogurt, guacamole, nuts, etc)      Dinner: Lower carb  Note: If you're craving carbohydrates, then have 1 serving of carbs = 15-20 grams = ½ cup cooked whole grain starch   Focus on 6-7 oz lean  protein + 1 cup or more non-starchy veggies + small amount of heart healthy fat  Carb swaps:  Hearts of palm-based 'linguini'  Brand example: Palmini   Lasagna made with Palmini lasagna sheets à this one comes in a can  'Riced cauliflower' à Can make from scratch + available in convenient frozen Steamables as well as shelf stable pouches  Cauliflower mash to mimic mashed potatoes  'Riced broccoli'   Zoodles (I don't recommend buying these frozen---they get watery---I recommend spiralizing yourself)  Spiralized beets  Spaghetti squash   Outer Aisle Plantpower à makes pre-made frozen cauliflower pizza crust & wraps as well as frozen sandwich thins  Great for making low carb pizzas, burgers and sandwiches  Harish'flour frozen flatbreads and pizza crustsà any flavor   Great for making low carb pizzas, burgers, and sandwiches    Think outside the box for low carb dishes:  Kabobs, stir montez with riced cauliflower or riced broccoli, stuffed bell peppers with no rice, lettuce wraps, eggplant lasagna, shrimp etouffee made with riced cauliflower, request a sushi roll that contains raw fish to be made without rice, etc  Quest pizza à low carb and high protein frozen pizza. Add additional side of non-starchy veggies      Optional post-dinner sweet: Anything up to 200 calories  'Fun size'                    Building A Better Smoothie  Choose your protein. Pick 1 from the followin oz 2% Plain Greek yogurt  5 oz 2% Cottage cheese  Plant-based protein powder  Orgain  Sunwarrior  Moore  Garden of Life RAW  100% whey protein powder  2 scoops Collagen Peptides (Vital Proteins is a brand favorite)  Make it sweet:  Choose 1 cup frozen or fresh fruit of your choice to mary jo up your smoothie  Make sure you are choosing frozen fruit with no sugar added; be sure to look at the ingredient list  Add your veggies:  Instead of ice, choose frozen cauliflower florets. Cauliflower helps with the detoxification process in our bodies, and  it's virtually tasteless!  Greens: spinach, kale, or other leafy greens  Beets are a great addition to smoothies, especially paired with strawberries and lemon  Cucumbers and celery are refreshing ways to enhance nutrition and hydration within your smoothie  Add in a plant-based fat:   Nut Butter: 1 tablespoon  Natural peanut butter  Tererro butter  Cashew butter  Nuts: ~2-3 tablespoons   Avocado (¼)  Avocado oil  EVOO: 1 tablespoon  Add a liquid to reach your desired consistency:  Unsweetened/No sugar added plant-based milk   Tererro milk, Hemp milk, Cashew milk, Coconut milk, Rice milk, Soy milk  Milk: 1% or 2%  Water   Healthy add-ins for an extra nutritional boost:  1 tablespoon flaxseeds or ed seeds        Restaurant Tips      Pick 1 out of the 4 Rule: Instead of eating bread/tortilla chips, an appetizer, alcoholic drink and dessert, choose just one to have with your entrée  Focus on lean proteins: Refer to lean meat/meat substitutes page in meal planning guidebook. Select items grilled, baked, broiled, braised, poached or roasted      For your heart health, avoid crispy, crunchy, breaded, paneed or stuffed items and items that are cream based, au gratin or buttered      Order sauces, dressings, and gravies on the side. This way you can add 1-2 tablespoons yourself. This helps with portion control     Request extra non-starchy vegetables instead of a starchy side dish. If the starchy side is something you love, consider splitting it with someone else at the table     Beverages: Order water with lemon, sparkling water, or unsweetened tea. Avoid sugary soft drinks, juices and mixers    Dining Out     [facility]  Designed to take the guesswork out of dining out healthfully, [facility] makes the healthy choice the easy choice  Visit    Order the Eat Fit Cookbook to create restaurant quality dishes at home  Download the [facility] alyce for free on your smartphone  All [employer] restaurants &  dishes by location   Nutrition facts for every Eat Fit dish  200 + Eat Fit approved recipes  Grocery shopping guides + community wellness resources    - Ordering A Better-For-You Salad:  Look for lean protein, unlimited non-starchy vegetables, and plant-based fats  Order dressings on the side to better control portion sizes. Add 1-2 tablespoons yourself to lightly coat  Pick 2-3 salad add-ins, each being ~2 tablespoons:  Dressing  Cheese  Nuts  Castle  Dried Fruit  Avocado/Guacamole  Egg yolks        Additional Nutrition Tips    -Rule of thumb for Aisle products: (Food products located in the center aisles of grocery stores):   Deepak #7 Rule  Look for products that have 7 grams or less added sugar, and at least 7 grams or more protein    -Frozen Meal Guidelines:   Aim for meals that contain 45 grams or less of carbohydrates and 20 grams or more protein  Note: If you find one you like that doesn't have 20 grams protein, you can add leftover lean protein to the frozen meal  Eating Well and Healthy Choice POWER Bowls are two of my favorite brands  Refer to the Eat Fit Shopping Guide for additional brand specific recommendations    -Choose 100% whole wheat/whole grain products   -Note: 'wheat' bread and 'wheat' flour are white bread/white flour    -Unsweetened frozen fruits and veggies contain the same nutritional value as fresh fruit and veggies. I recommend keeping these as staples in your freezer     -Taking the skin off fried chicken is basically the same as eating grilled chicken  Compromise: If the skin is your favorite part, then eat your first piece of fried chicken without skin, and your last piece with skin  Note: Fried foods shouldn't be consumed more than twice a month    -Training/retraining the body is key, and may take some time:   Whatever you are currently doing for your meals/snacks and exercise routine, your body is used to it  When changing a habit(s), it's normal for it to be a little hard at  first. Your body gets used to new habits over time with consistency    -Water is not only important for hydration, but also for feeling alert and more energized. If our body is even slightly dehydrated, that could make us feel more fatigued throughout the day  Aim for all daily fluids being half your body weight in oz + sweat loss (see under Energy Requirements at the top of page 1 for your daily total fluid intake)    -I recommend measuring everything in cooked portions to see what each recommended portion looks like on your plate and bowls; Then eyeball after you feel comfortable with recommended portions    -What potentially increases inflammation/flare-ups in our body?  White/refined carbohydrates  Sugar  Alcohol  Fried foods    -If you're having trouble finding a specific food product, try looking on the brands website. Most companies have a 'store /find a store' on their website        Favorite Food Brands    - Whole Grain Breads:  Michi's Killer Bread - 21 Whole Grains and Seeds (green label), Good Seed (yellow label), Power Seed (red label)   Thin sliced -or- regular slice  John Bread - all varieties, in freezer section  Base Culture Bread - 7 Nut + Seed and Original Paleo Break (GF and found in freezer section)  Per slice: 110 calories and 4 grams net carbs  Great to use for dinner since low carb    -Whole Grain Tortillas  Wraps:  Corn   Coconut wrap - (Typically found in refrigerated section by cheese)  DELBERT CRUZ - 100% whole wheat   [place]raw - Whole wheat tortillas + whole wheat carb balance    -Whole Grain Pancakes  Waffles:  Tye Cake 100% whole grain pancake/waffle mix (my favorite)  Flapjacked- Protein pancake & baking mix   Flapjacked - Mighty Muffin (typically found on baking aisle)    -Whole Grain Frozen Waffles:  Kashi GO Protein Waffles  Tye Cakes - Power Waffles + Pancakes (plain)  Van's Power Grains Original    -Frozen Breakfast Sandwiches  Bowls:  Virgilio MIRANDA-Light on English  muffin  GoodFood (egg white ruth breakfast sandwich)  SweetEarth (comes on a whole grain flatbread breakfast sandwich)  Realgood - Breakfast Sandwiches on Cauliflower Cheesy Bread  EVOL morning bowls    EVOL lean and fit options        -Better-For-You Pasta:  Banza chickpea pasta - all varieties   Red Lentil or Yellow Lentil pasta  Black bean pasta (aka squid ink pasta)  Edamame-based pasta  [place] gluten free quinoa pasta (found on gluten free aisle. This brand tastes like white pasta, but is a whole grain)    - Red Sauce in a Jar:  Omar & Syeda's Heart Smart Sauce (available flavors: Roasted Garlic, Gary or Original)  Aylin4Techs Finest Gourmet Foods Pasta Sauce  Classico Original  Engine 2 Plant-Strong      -Whole Grain Cereals:  Special K PROTEIN  Premier Protein (20-gram protein cereal à available at your typical grocery store)  Kashi Go Grain Free  Dark Cocoa + Cinnamon Vanilla  Maggie Crunch Keto-Friendly Cereals  Three Wishes  Magic Spoon Grain-Free Cereals (online only)    -Grab & Go Oatmeal Cups:  Powerful Overnight Oats  [place]: GF Oatmeal with Flax and Vidal  Wild Friends: Oats & Nut Butter  Think Thin: Protein & Fiber Hot Oatmeal    -Oatmeal Packets:  Kashi Go Lean Creamy All-Natural Vanilla  Pentecostalism - Low Sugar - any flavor    -100% Whole Grain Chips:  SunChips  Beanito's  Beanfields Bean Chips  Popcorners- FLEX Protein Crisps (10 grams protein per serving)  Garden of Eatin - Blue Corn Chips    -High Protein Chips: (Note: All Are Gluten Free)  Iwon - Found at [facility], Vitamin Shoppe, Whole Foods or Amazon  Aniya's Natural's Protein Chips + Pretzels (amazon)  Quest  Protes    -100% Whole Grain Crackers:  Triscuits - Regular + thin crisps  Wheat Thins  Blue Gabrielle almond nut thins  Barb's Gone Cracker  Whisps Parmesan Cheese Crisps (contains only 1 gram carbohydrate per serving)  Crunchmaster protein sea salt cracker    -Protein  Granola Bars that Meet the Deepak #7 Rule:  [place]  Protein Chewy (GF)  Kashi Grain Free Coconut [place]  Kashi Go Protein Bar (GF)  KIND Protein + KIND Bars (with 7 grams sugar or less) (GF)  Rx Bar (GF)  Rx Layers Bars (GF)  Oatmega Bars    -Nut [place] & Jellies:  Harris's Nut [place]  Nuts 'N More Nut [place]  MaraNatha Nut [place]  Wild Friends Protein Nut [place]  SunButter, unsweetened  Smart Balance- Natural PB  Jelly: Polaner's All Fruit NSA (no sugar added)    -BBQ Sauce:   PAWAN all natural   Primal Kitchen Classic BBQ sauce    -Salad Dressings:  Hellen's Salad Dressing: Sensation, Balsamic, Strawberry, Avocado, Caesar, Creole Ranch, Sweet Creole Mustard, Garlic & Red Wine   Primal Kitchen- all varieties    Fish's Own - Balsamic Vinaigrette, Classic Oil & Vinegar     -Ready-to-Drink Protein Drinks:  Iconic Grass-Fed Protein Drink  Orgain Clean- grass-fed + plant based  OWYN Plant-Based Protein Shake  WangYou 30-gram Protein Drink  [facility] CORE POWER Protein Drink    -Granola: [Note: granola should be used as a topper for a crunch, and not as a main meal]  ProGranola by Fili WinProbe  Engine 2 Plant Strong  Good Granoly Health Nut    -Milk:  Favorite brands, which are also lactose free, that contain half the sugar & double the protein + lasts ~2 months in your fridge compared to regular milk:  [facility] milk (skim, 1% or 2%)  [facility] ULTRA reduced fat milk     -Already Flavored Greek Yogurt:  Chobani Less Sugar  Oikos Triple Zero  Two Good - All varieties     -Plant-Based High Protein Yogurt:  [place] - unsweetened Greek style yogurt, plain (Dairy free)  [place]- almond milk yogurt, vanilla or plain, unsweetened (Dairy free)  Siggi's Coconut Blend Yogurt      -Brown Rice: Any brand that makes a '10-minute boil in a bag' brown rice  Brand examples: Success  Uncle Bens    -Vital Proteins Collage Peptides, unflavored: Great to have as a staple in your pantry. You can add to soups, hummus, coffee, etc to make higher  protein    -Supplements:  Vitamin D3: 1,000 - 2,000 i.u per day   Fish Oil: Look for at least 1200 mg EPA + DHA per 2 capsules  My favorite brand is Sosh's Ultimate Omega  One-A-Day MVI  Brand example: [facility] Rodger Men     -If you would like to schedule a follow-up appointment in 4-5 weeks, please call [facility] at [phone]

## 2022-11-17 ENCOUNTER — PATIENT MESSAGE (OUTPATIENT)
Dept: INTERNAL MEDICINE | Facility: CLINIC | Age: 27
End: 2022-11-17
Payer: COMMERCIAL

## 2023-01-16 NOTE — PROGRESS NOTES
[facility]    Subjective:       Patient ID: Adi Siddiqui Jr. is a 27 y.o. male.    Chief Complaint: Hypothyroidism (F/u)      History was obtained from the patient and supplemented through chart review.  This patient is known to me.      HPI:    Patient is a 27 y.o. male w/ hypothyroid, HTN, hepatic steatosis, smoker who presents for tg, thyroid f/u.    Hypothyroid- significant hypertriglyceridemia in past  Not taking 175 mcg; hadn't picked up meds from [facility]  Recheck now without meds, reordered and recheck in 6 weeks  Saw endo in the past    Elevated ALT  Recheck not performed  Advice given in portal to decrease ETOH    Elevated TG  Fenofibrate 45    New HTN  Repeat, f/u 2 weeks  Advised monitpr  Suggested med. Pt wishes to avoid    Past stomach pain- advised miralax    Smoke  4-5 years 0.5- or more a day  Counseled on cessation again for BP sake    Depression  In remission without meds  Used to take lexapro, stopped 2/2 sexual side effects     Working lots  [employer] industry    Wt Readings from Last 15 Encounters:   01/17/23 114.3 kg (251 lb 15.8 oz)   07/15/22 115 kg (253 lb 8.5 oz)   01/05/22 114.9 kg (253 lb 4.9 oz)   05/18/21 104.8 kg (231 lb)   04/07/21 104.9 kg (231 lb 4.2 oz)   02/19/21 107.1 kg (236 lb 1.8 oz)   09/21/20 108.4 kg (239 lb)   03/09/20 108.7 kg (239 lb 10.2 oz)   02/22/19 114.9 kg (253 lb 4.9 oz)   03/17/18 97.5 kg (215 lb)   07/13/17 98.6 kg (217 lb 6 oz)   12/23/16 95.3 kg (210 lb)   11/23/16 98.7 kg (217 lb 9.5 oz)        Medical History  Past Medical History:   Diagnosis Date    Depression     Hypertriglyceridemia     Hypothyroidism        Review of Systems   Constitutional:  Negative for appetite change and diaphoresis.   HENT:  Negative for trouble swallowing.    Respiratory:  Negative for shortness of breath.    Cardiovascular:  Negative for chest pain.   Gastrointestinal:  Negative for vomiting.   Musculoskeletal:  Negative for gait problem.   Neurological:   "Negative for dizziness and seizures.   Psychiatric/Behavioral:  Negative for hallucinations.        Surgical hx, family hx, social hx   Have been reviewed      Current Outpatient Medications:     fenofibrate (TRICOR) 48 MG tablet, Take 1 tablet (48 mg total) by mouth once daily., Disp: 90 tablet, Rfl: 4    levothyroxine (SYNTHROID, LEVOTHROID) 175 MCG tablet, Take 1 tablet (175 mcg total) by mouth once daily., Disp: 90 tablet, Rfl: 0    Objective:        Body mass index is 33.25 kg/m².  Vitals:    01/17/23 1141 01/17/23 1216   BP: (!) 132/98 (!) 132/96   Pulse: 83    SpO2: 97%    Weight: 114.3 kg (251 lb 15.8 oz)    Height: 6' 1" (1.854 m)    PainSc: 0-No pain        Physical Exam  Vitals and nursing note reviewed.   Constitutional:       General: He is not in acute distress.     Appearance: Normal appearance. He is not ill-appearing.   HENT:      Head: Normocephalic and atraumatic.   Eyes:      General: No scleral icterus.  Cardiovascular:      Rate and Rhythm: Normal rate and regular rhythm.      Heart sounds: Normal heart sounds.   Pulmonary:      Effort: Pulmonary effort is normal.   Abdominal:      General: There is no distension.   Musculoskeletal:         General: No deformity.      Cervical back: Normal range of motion.   Skin:     General: Skin is warm and dry.   Neurological:      Mental Status: He is alert and oriented to person, place, and time.   Psychiatric:         Behavior: Behavior normal.         Lab Results   Component Value Date    WBC 6.79 07/15/2022    HGB 16.0 07/15/2022    HCT 44.7 07/15/2022     07/15/2022    CHOL 205 (H) 07/15/2022    TRIG 660 (H) 07/15/2022    HDL 34 (L) 07/15/2022    ALT 71 (H) 07/15/2022    AST 37 07/15/2022     07/15/2022    K 4.4 07/15/2022     07/15/2022    CREATININE 0.8 07/15/2022    BUN 10 07/15/2022    CO2 25 07/15/2022    TSH 3.065 07/15/2022    HGBA1C 4.8 07/15/2022     Revieded lipids, elevated TGs      The ASCVD Risk score (Colten DK, et " al., 2019) failed to calculate for the following reasons:    The 2019 ASCVD risk score is only valid for ages 40 to 79    (Imaging have been independently reviewed)      Assessment:         1. Familial hypertriglyceridemia    2. Hypertriglyceridemia    3. Acquired hypothyroidism    4. Elevated ALT measurement    5. Primary hypertension    6. Hepatic steatosis            Plan:     Adi was seen today for hypothyroidism.    Diagnoses and all orders for this visit:    Familial hypertriglyceridemia  -     Lipid Panel; Future    Hypertriglyceridemia  -     Lipid Panel; Future    Acquired hypothyroidism  -     TSH; Future  -     TSH; Future  -     levothyroxine (SYNTHROID, LEVOTHROID) 175 MCG tablet; Take 1 tablet (175 mcg total) by mouth once daily.    Elevated ALT measurement  -     Comprehensive Metabolic Panel; Future    Primary hypertension    Hepatic steatosis           Follow up in about 2 months (around 3/17/2023).        All medications were reviewed including potential side effects and risks/benefits.  Pt was counseled to call back if anything worsens or if questions arise.    Cory Andrade MD  Family Medicine  [facility]  [address] [address]  Phone [phone]  Fax [phone]

## 2023-01-17 ENCOUNTER — OFFICE VISIT (OUTPATIENT)
Dept: INTERNAL MEDICINE | Facility: CLINIC | Age: 28
End: 2023-01-17
Payer: COMMERCIAL

## 2023-01-17 ENCOUNTER — LAB VISIT (OUTPATIENT)
Dept: LAB | Facility: OTHER | Age: 28
End: 2023-01-17
Attending: STUDENT IN AN ORGANIZED HEALTH CARE EDUCATION/TRAINING PROGRAM
Payer: COMMERCIAL

## 2023-01-17 VITALS
HEIGHT: 73 IN | DIASTOLIC BLOOD PRESSURE: 96 MMHG | BODY MASS INDEX: 33.4 KG/M2 | SYSTOLIC BLOOD PRESSURE: 132 MMHG | HEART RATE: 83 BPM | WEIGHT: 252 LBS | OXYGEN SATURATION: 97 %

## 2023-01-17 DIAGNOSIS — K76.0 HEPATIC STEATOSIS: ICD-10-CM

## 2023-01-17 DIAGNOSIS — E78.1 FAMILIAL HYPERTRIGLYCERIDEMIA: Primary | ICD-10-CM

## 2023-01-17 DIAGNOSIS — I10 PRIMARY HYPERTENSION: ICD-10-CM

## 2023-01-17 DIAGNOSIS — E03.9 ACQUIRED HYPOTHYROIDISM: ICD-10-CM

## 2023-01-17 DIAGNOSIS — E78.1 HYPERTRIGLYCERIDEMIA: ICD-10-CM

## 2023-01-17 DIAGNOSIS — R74.01 ELEVATED ALT MEASUREMENT: ICD-10-CM

## 2023-01-17 DIAGNOSIS — E78.1 FAMILIAL HYPERTRIGLYCERIDEMIA: ICD-10-CM

## 2023-01-17 LAB
ALBUMIN SERPL BCP-MCNC: 4.2 G/DL (ref 3.5–5.2)
ALP SERPL-CCNC: 77 U/L (ref 55–135)
ALT SERPL W/O P-5'-P-CCNC: 49 U/L (ref 10–44)
ANION GAP SERPL CALC-SCNC: 14 MMOL/L (ref 8–16)
AST SERPL-CCNC: 26 U/L (ref 10–40)
BILIRUB SERPL-MCNC: 0.6 MG/DL (ref 0.1–1)
BUN SERPL-MCNC: 8 MG/DL (ref 6–20)
CALCIUM SERPL-MCNC: 10.2 MG/DL (ref 8.7–10.5)
CHLORIDE SERPL-SCNC: 103 MMOL/L (ref 95–110)
CHOLEST SERPL-MCNC: 289 MG/DL (ref 120–199)
CHOLEST/HDLC SERPL: 11.6 {RATIO} (ref 2–5)
CO2 SERPL-SCNC: 19 MMOL/L (ref 23–29)
CREAT SERPL-MCNC: 0.8 MG/DL (ref 0.5–1.4)
EST. GFR  (NO RACE VARIABLE): >60 ML/MIN/1.73 M^2
GLUCOSE SERPL-MCNC: 87 MG/DL (ref 70–110)
HDLC SERPL-MCNC: 25 MG/DL (ref 40–75)
HDLC SERPL: 8.7 % (ref 20–50)
LDLC SERPL CALC-MCNC: ABNORMAL MG/DL (ref 63–159)
NONHDLC SERPL-MCNC: 264 MG/DL
POTASSIUM SERPL-SCNC: 4 MMOL/L (ref 3.5–5.1)
PROT SERPL-MCNC: 8.1 G/DL (ref 6–8.4)
SODIUM SERPL-SCNC: 136 MMOL/L (ref 136–145)
T4 FREE SERPL-MCNC: 1.02 NG/DL (ref 0.71–1.51)
TRIGL SERPL-MCNC: 1709 MG/DL (ref 30–150)
TSH SERPL DL<=0.005 MIU/L-ACNC: 6.11 UIU/ML (ref 0.4–4)

## 2023-01-17 PROCEDURE — 3008F PR BODY MASS INDEX (BMI) DOCUMENTED: ICD-10-PCS | Mod: CPTII,S$GLB,, | Performed by: STUDENT IN AN ORGANIZED HEALTH CARE EDUCATION/TRAINING PROGRAM

## 2023-01-17 PROCEDURE — 3080F DIAST BP >= 90 MM HG: CPT | Mod: CPTII,S$GLB,, | Performed by: STUDENT IN AN ORGANIZED HEALTH CARE EDUCATION/TRAINING PROGRAM

## 2023-01-17 PROCEDURE — 84443 ASSAY THYROID STIM HORMONE: CPT | Performed by: STUDENT IN AN ORGANIZED HEALTH CARE EDUCATION/TRAINING PROGRAM

## 2023-01-17 PROCEDURE — 99214 PR OFFICE/OUTPT VISIT, EST, LEVL IV, 30-39 MIN: ICD-10-PCS | Mod: S$GLB,,, | Performed by: STUDENT IN AN ORGANIZED HEALTH CARE EDUCATION/TRAINING PROGRAM

## 2023-01-17 PROCEDURE — 80053 COMPREHEN METABOLIC PANEL: CPT | Performed by: STUDENT IN AN ORGANIZED HEALTH CARE EDUCATION/TRAINING PROGRAM

## 2023-01-17 PROCEDURE — 99999 PR PBB SHADOW E&M-EST. PATIENT-LVL III: ICD-10-PCS | Mod: PBBFAC,,, | Performed by: STUDENT IN AN ORGANIZED HEALTH CARE EDUCATION/TRAINING PROGRAM

## 2023-01-17 PROCEDURE — 3075F SYST BP GE 130 - 139MM HG: CPT | Mod: CPTII,S$GLB,, | Performed by: STUDENT IN AN ORGANIZED HEALTH CARE EDUCATION/TRAINING PROGRAM

## 2023-01-17 PROCEDURE — 36415 COLL VENOUS BLD VENIPUNCTURE: CPT | Performed by: STUDENT IN AN ORGANIZED HEALTH CARE EDUCATION/TRAINING PROGRAM

## 2023-01-17 PROCEDURE — 99214 OFFICE O/P EST MOD 30 MIN: CPT | Mod: S$GLB,,, | Performed by: STUDENT IN AN ORGANIZED HEALTH CARE EDUCATION/TRAINING PROGRAM

## 2023-01-17 PROCEDURE — 3008F BODY MASS INDEX DOCD: CPT | Mod: CPTII,S$GLB,, | Performed by: STUDENT IN AN ORGANIZED HEALTH CARE EDUCATION/TRAINING PROGRAM

## 2023-01-17 PROCEDURE — 84439 ASSAY OF FREE THYROXINE: CPT | Performed by: STUDENT IN AN ORGANIZED HEALTH CARE EDUCATION/TRAINING PROGRAM

## 2023-01-17 PROCEDURE — 1159F PR MEDICATION LIST DOCUMENTED IN MEDICAL RECORD: ICD-10-PCS | Mod: CPTII,S$GLB,, | Performed by: STUDENT IN AN ORGANIZED HEALTH CARE EDUCATION/TRAINING PROGRAM

## 2023-01-17 PROCEDURE — 1159F MED LIST DOCD IN RCRD: CPT | Mod: CPTII,S$GLB,, | Performed by: STUDENT IN AN ORGANIZED HEALTH CARE EDUCATION/TRAINING PROGRAM

## 2023-01-17 PROCEDURE — 3080F PR MOST RECENT DIASTOLIC BLOOD PRESSURE >= 90 MM HG: ICD-10-PCS | Mod: CPTII,S$GLB,, | Performed by: STUDENT IN AN ORGANIZED HEALTH CARE EDUCATION/TRAINING PROGRAM

## 2023-01-17 PROCEDURE — 99999 PR PBB SHADOW E&M-EST. PATIENT-LVL III: CPT | Mod: PBBFAC,,, | Performed by: STUDENT IN AN ORGANIZED HEALTH CARE EDUCATION/TRAINING PROGRAM

## 2023-01-17 PROCEDURE — 3075F PR MOST RECENT SYSTOLIC BLOOD PRESS GE 130-139MM HG: ICD-10-PCS | Mod: CPTII,S$GLB,, | Performed by: STUDENT IN AN ORGANIZED HEALTH CARE EDUCATION/TRAINING PROGRAM

## 2023-01-17 PROCEDURE — 80061 LIPID PANEL: CPT | Performed by: STUDENT IN AN ORGANIZED HEALTH CARE EDUCATION/TRAINING PROGRAM

## 2023-01-17 RX ORDER — LEVOTHYROXINE SODIUM 175 UG/1
175 TABLET ORAL DAILY
Qty: 90 TABLET | Refills: 0 | Status: SHIPPED | OUTPATIENT
Start: 2023-01-17 | End: 2023-04-16

## 2023-03-01 ENCOUNTER — TELEPHONE (OUTPATIENT)
Dept: INTERNAL MEDICINE | Facility: CLINIC | Age: 28
End: 2023-03-01
Payer: COMMERCIAL

## 2023-03-03 ENCOUNTER — TELEPHONE (OUTPATIENT)
Dept: INTERNAL MEDICINE | Facility: CLINIC | Age: 28
End: 2023-03-03
Payer: COMMERCIAL

## 2023-03-14 ENCOUNTER — TELEPHONE (OUTPATIENT)
Dept: INTERNAL MEDICINE | Facility: CLINIC | Age: 28
End: 2023-03-14
Payer: COMMERCIAL

## 2023-03-14 NOTE — TELEPHONE ENCOUNTER
----- Message from Lauren Duane sent at 3/14/2023  8:15 AM CDT -----  Contact: AIDE LOBO JR. [[id number]]  Type: Call Back      Who called:AIDE LOBO JR. [[id number]]      What is the request in detail: Patient is requesting a call back. Pt states that he had to reschedule his appointment due to him running late. He would like to know if he an be seen sooner than July.   Please advise.     Can the clinic reply by [facility]? Yes      Would the patient rather a call back or a response via [facility]? Call back       Best call back number: [phone] (home)       Additional Information:

## 2023-03-22 ENCOUNTER — PATIENT MESSAGE (OUTPATIENT)
Dept: ADMINISTRATIVE | Facility: HOSPITAL | Age: 28
End: 2023-03-22
Payer: COMMERCIAL

## 2023-03-31 ENCOUNTER — PATIENT MESSAGE (OUTPATIENT)
Dept: INTERNAL MEDICINE | Facility: CLINIC | Age: 28
End: 2023-03-31
Payer: COMMERCIAL

## 2023-04-14 DIAGNOSIS — E03.9 ACQUIRED HYPOTHYROIDISM: ICD-10-CM

## 2023-04-14 NOTE — TELEPHONE ENCOUNTER
Care Due:                  Date            Visit Type   Department     Provider  --------------------------------------------------------------------------------                                EP -                              PRIMARY      [facility] INTERNAL  Last Visit: 01-      CARE ([facility])   JANETTE Andrade                              EP -                              PRIMARY      [facility] INTERNAL  Next Visit: 05-      CARE ([facility])   JANETTE Andrade                                                            Last  Test          Frequency    Reason                     Performed    Due Date  --------------------------------------------------------------------------------    CBC.........  12 months..  fenofibrate..............  07-   07-    [facility] Embedded Care Gaps. Reference number: [id number]. 4/14/2023   5:15:52 PM CDT

## 2023-04-15 NOTE — TELEPHONE ENCOUNTER
Refill Routing Note   Refill Routing Note   Medication(s) are not appropriate for processing by [facility] for the following reason(s):      New or recently adjusted medication    [facility] action(s):  Defer       Medication Therapy Plan: T4 WNL  Medication reconciliation completed: No     Appointments  past 12m or future 3m with PCP    Date Provider   Last Visit   1/17/2023 Cory Andrade MD   Next Visit   5/5/2023 Cory Andrade MD   ED visits in past 90 days: 0        Note composed:8:43 AM 04/15/2023

## 2023-04-16 RX ORDER — LEVOTHYROXINE SODIUM 175 UG/1
TABLET ORAL
Qty: 15 TABLET | Refills: 0 | Status: SHIPPED | OUTPATIENT
Start: 2023-04-16 | End: 2023-05-14 | Stop reason: SDUPTHER

## 2023-04-24 NOTE — PROGRESS NOTES
[facility]    Subjective:       Patient ID: Adi Siddiqui Jr. is a 27 y.o. male.    Chief Complaint: Hypertension (F/u)      History was obtained from the patient and supplemented through chart review.  This patient is known to me.      HPI:    Patient is a 27 y.o. male w/ hypothyroid, HTN, hepatic steatosis, smoker who presents for tg, thyroid f/u.    Hypothyroid- significant hypertriglyceridemia in past  175 mcg  Recheck overdue    Elevated ALT; recheck  Aware hepatic steatosis    Elevated TG  Fenofibrate 45  recheck    Hepatic steatosis  advised    Elevated bp in the past, no meds and controlled  Lost weight, and doing better    Past stomach pain- advised miralax/metamucil    Smoke  4-5 years 0.5 a day-  or more a day  Counseled on cessation again for BP sake    Depression  In remission without meds  Used to take lexapro, stopped 2/2 sexual side effects     Working lots  [employer]; working more in [employer]    Wt Readings from Last 15 Encounters:   04/26/23 109.3 kg (240 lb 15.4 oz)   01/17/23 114.3 kg (251 lb 15.8 oz)   07/15/22 115 kg (253 lb 8.5 oz)   01/05/22 114.9 kg (253 lb 4.9 oz)   05/18/21 104.8 kg (231 lb)   04/07/21 104.9 kg (231 lb 4.2 oz)   02/19/21 107.1 kg (236 lb 1.8 oz)   09/21/20 108.4 kg (239 lb)   03/09/20 108.7 kg (239 lb 10.2 oz)   02/22/19 114.9 kg (253 lb 4.9 oz)   03/17/18 97.5 kg (215 lb)   07/13/17 98.6 kg (217 lb 6 oz)   12/23/16 95.3 kg (210 lb)   11/23/16 98.7 kg (217 lb 9.5 oz)        Medical History  Past Medical History:   Diagnosis Date    Depression     Hypertriglyceridemia     Hypothyroidism        Review of Systems   Constitutional:  Negative for appetite change and diaphoresis.   HENT:  Negative for trouble swallowing.    Respiratory:  Negative for shortness of breath.    Cardiovascular:  Negative for chest pain.   Gastrointestinal:  Negative for vomiting.   Musculoskeletal:  Negative for gait problem.   Skin:         Athlete's feet  "  Neurological:  Negative for dizziness and seizures.   Psychiatric/Behavioral:  Negative for hallucinations.        Surgical hx, family hx, social hx   Have been reviewed      Current Outpatient Medications:     fenofibrate (TRICOR) 48 MG tablet, Take 1 tablet (48 mg total) by mouth once daily., Disp: 90 tablet, Rfl: 4    levothyroxine (SYNTHROID, LEVOTHROID) 175 MCG tablet, TAKE 1 TABLET(175 MCG) BY MOUTH EVERY DAY, Disp: 15 tablet, Rfl: 0    miconazole (MICOTIN) 2 % cream, Apply topically 2 (two) times daily., Disp: 106 g, Rfl: 0    Objective:        Body mass index is 31.79 kg/m².  Vitals:    04/26/23 1123   BP: 118/82   Pulse: 74   SpO2: 98%   Weight: 109.3 kg (240 lb 15.4 oz)   Height: 6' 1" (1.854 m)   PainSc: 0-No pain         Physical Exam  Vitals and nursing note reviewed.   Constitutional:       General: He is not in acute distress.     Appearance: Normal appearance. He is not ill-appearing.   HENT:      Head: Normocephalic and atraumatic.   Eyes:      General: No scleral icterus.  Pulmonary:      Effort: Pulmonary effort is normal.   Abdominal:      General: There is no distension.   Musculoskeletal:         General: No deformity.      Cervical back: Normal range of motion.   Skin:     General: Skin is warm and dry.   Neurological:      Mental Status: He is alert and oriented to person, place, and time.   Psychiatric:         Behavior: Behavior normal.         Lab Results   Component Value Date    WBC 6.79 07/15/2022    HGB 16.0 07/15/2022    HCT 44.7 07/15/2022     07/15/2022    CHOL 289 (H) 01/17/2023    TRIG 1,709 (H) 01/17/2023    HDL 25 (L) 01/17/2023    ALT 34 04/26/2023    AST 20 04/26/2023     04/26/2023    K 4.2 04/26/2023     04/26/2023    CREATININE 0.8 04/26/2023    BUN 12 04/26/2023    CO2 24 04/26/2023    TSH 6.107 (H) 01/17/2023    HGBA1C 4.8 07/15/2022     Revieded lipids, elevated TGs      The ASCVD Risk score (Colten MARSHALL, et al., 2019) failed to calculate for the " following reasons:    The 2019 ASCVD risk score is only valid for ages 40 to 79    (Imaging have been independently reviewed)    Reviewed old us abdom    Assessment:         1. Acquired hypothyroidism    2. Tinea corporis    3. Familial hypertriglyceridemia    4. Hypertriglyceridemia    5. Elevated ALT measurement    6. Hepatic steatosis    7. Morbid obesity    8. Primary hypertension              Plan:     Adi was seen today for hypertension.    Diagnoses and all orders for this visit:    Acquired hypothyroidism    Tinea corporis  -     Ambulatory referral/consult to Podiatry; Future  -     miconazole (MICOTIN) 2 % cream; Apply topically 2 (two) times daily.    Familial hypertriglyceridemia  -     Lipid Panel; Future    Hypertriglyceridemia  -     Lipid Panel; Future    Elevated ALT measurement  -     COMPREHENSIVE METABOLIC PANEL; Future    Hepatic steatosis    Morbid obesity    Primary hypertension         Follow up in about 9 months (around 1/26/2024) for annual or sooner if needed.        All medications were reviewed including potential side effects and risks/benefits.  Pt was counseled to call back if anything worsens or if questions arise.    Cory Andrade MD  Family Medicine  [facility]  [address] [address]  Phone [phone]  Fax [phone]

## 2023-04-26 ENCOUNTER — LAB VISIT (OUTPATIENT)
Dept: LAB | Facility: OTHER | Age: 28
End: 2023-04-26
Attending: STUDENT IN AN ORGANIZED HEALTH CARE EDUCATION/TRAINING PROGRAM
Payer: COMMERCIAL

## 2023-04-26 ENCOUNTER — OFFICE VISIT (OUTPATIENT)
Dept: INTERNAL MEDICINE | Facility: CLINIC | Age: 28
End: 2023-04-26
Payer: COMMERCIAL

## 2023-04-26 VITALS
HEIGHT: 73 IN | WEIGHT: 240.94 LBS | SYSTOLIC BLOOD PRESSURE: 118 MMHG | BODY MASS INDEX: 31.93 KG/M2 | DIASTOLIC BLOOD PRESSURE: 82 MMHG | OXYGEN SATURATION: 98 % | HEART RATE: 74 BPM

## 2023-04-26 DIAGNOSIS — R74.01 ELEVATED ALT MEASUREMENT: ICD-10-CM

## 2023-04-26 DIAGNOSIS — E03.9 ACQUIRED HYPOTHYROIDISM: Primary | ICD-10-CM

## 2023-04-26 DIAGNOSIS — E78.1 HYPERTRIGLYCERIDEMIA: ICD-10-CM

## 2023-04-26 DIAGNOSIS — K76.0 HEPATIC STEATOSIS: ICD-10-CM

## 2023-04-26 DIAGNOSIS — E03.9 ACQUIRED HYPOTHYROIDISM: ICD-10-CM

## 2023-04-26 DIAGNOSIS — E78.1 FAMILIAL HYPERTRIGLYCERIDEMIA: ICD-10-CM

## 2023-04-26 DIAGNOSIS — I10 PRIMARY HYPERTENSION: ICD-10-CM

## 2023-04-26 DIAGNOSIS — B35.4 TINEA CORPORIS: ICD-10-CM

## 2023-04-26 DIAGNOSIS — E66.01 MORBID OBESITY: ICD-10-CM

## 2023-04-26 DIAGNOSIS — B35.3 TINEA PEDIS, UNSPECIFIED LATERALITY: ICD-10-CM

## 2023-04-26 LAB
ALBUMIN SERPL BCP-MCNC: 4.2 G/DL (ref 3.5–5.2)
ALP SERPL-CCNC: 63 U/L (ref 55–135)
ALT SERPL W/O P-5'-P-CCNC: 34 U/L (ref 10–44)
ANION GAP SERPL CALC-SCNC: 9 MMOL/L (ref 8–16)
AST SERPL-CCNC: 20 U/L (ref 10–40)
BILIRUB SERPL-MCNC: 0.9 MG/DL (ref 0.1–1)
BUN SERPL-MCNC: 12 MG/DL (ref 6–20)
CALCIUM SERPL-MCNC: 9.5 MG/DL (ref 8.7–10.5)
CHLORIDE SERPL-SCNC: 106 MMOL/L (ref 95–110)
CHOLEST SERPL-MCNC: 165 MG/DL (ref 120–199)
CHOLEST/HDLC SERPL: 5 {RATIO} (ref 2–5)
CO2 SERPL-SCNC: 24 MMOL/L (ref 23–29)
CREAT SERPL-MCNC: 0.8 MG/DL (ref 0.5–1.4)
EST. GFR  (NO RACE VARIABLE): >60 ML/MIN/1.73 M^2
GLUCOSE SERPL-MCNC: 91 MG/DL (ref 70–110)
HDLC SERPL-MCNC: 33 MG/DL (ref 40–75)
HDLC SERPL: 20 % (ref 20–50)
LDLC SERPL CALC-MCNC: 65.6 MG/DL (ref 63–159)
NONHDLC SERPL-MCNC: 132 MG/DL
POTASSIUM SERPL-SCNC: 4.2 MMOL/L (ref 3.5–5.1)
PROT SERPL-MCNC: 7.2 G/DL (ref 6–8.4)
SODIUM SERPL-SCNC: 139 MMOL/L (ref 136–145)
TRIGL SERPL-MCNC: 332 MG/DL (ref 30–150)
TSH SERPL DL<=0.005 MIU/L-ACNC: 1.38 UIU/ML (ref 0.4–4)

## 2023-04-26 PROCEDURE — 99999 PR PBB SHADOW E&M-EST. PATIENT-LVL III: CPT | Mod: PBBFAC,,, | Performed by: STUDENT IN AN ORGANIZED HEALTH CARE EDUCATION/TRAINING PROGRAM

## 2023-04-26 PROCEDURE — 3008F PR BODY MASS INDEX (BMI) DOCUMENTED: ICD-10-PCS | Mod: CPTII,S$GLB,, | Performed by: STUDENT IN AN ORGANIZED HEALTH CARE EDUCATION/TRAINING PROGRAM

## 2023-04-26 PROCEDURE — 1159F MED LIST DOCD IN RCRD: CPT | Mod: CPTII,S$GLB,, | Performed by: STUDENT IN AN ORGANIZED HEALTH CARE EDUCATION/TRAINING PROGRAM

## 2023-04-26 PROCEDURE — 80053 COMPREHEN METABOLIC PANEL: CPT | Performed by: STUDENT IN AN ORGANIZED HEALTH CARE EDUCATION/TRAINING PROGRAM

## 2023-04-26 PROCEDURE — 3008F BODY MASS INDEX DOCD: CPT | Mod: CPTII,S$GLB,, | Performed by: STUDENT IN AN ORGANIZED HEALTH CARE EDUCATION/TRAINING PROGRAM

## 2023-04-26 PROCEDURE — 80061 LIPID PANEL: CPT | Performed by: STUDENT IN AN ORGANIZED HEALTH CARE EDUCATION/TRAINING PROGRAM

## 2023-04-26 PROCEDURE — 84443 ASSAY THYROID STIM HORMONE: CPT | Performed by: STUDENT IN AN ORGANIZED HEALTH CARE EDUCATION/TRAINING PROGRAM

## 2023-04-26 PROCEDURE — 3074F SYST BP LT 130 MM HG: CPT | Mod: CPTII,S$GLB,, | Performed by: STUDENT IN AN ORGANIZED HEALTH CARE EDUCATION/TRAINING PROGRAM

## 2023-04-26 PROCEDURE — 99214 PR OFFICE/OUTPT VISIT, EST, LEVL IV, 30-39 MIN: ICD-10-PCS | Mod: S$GLB,,, | Performed by: STUDENT IN AN ORGANIZED HEALTH CARE EDUCATION/TRAINING PROGRAM

## 2023-04-26 PROCEDURE — 99999 PR PBB SHADOW E&M-EST. PATIENT-LVL III: ICD-10-PCS | Mod: PBBFAC,,, | Performed by: STUDENT IN AN ORGANIZED HEALTH CARE EDUCATION/TRAINING PROGRAM

## 2023-04-26 PROCEDURE — 3074F PR MOST RECENT SYSTOLIC BLOOD PRESSURE < 130 MM HG: ICD-10-PCS | Mod: CPTII,S$GLB,, | Performed by: STUDENT IN AN ORGANIZED HEALTH CARE EDUCATION/TRAINING PROGRAM

## 2023-04-26 PROCEDURE — 3079F DIAST BP 80-89 MM HG: CPT | Mod: CPTII,S$GLB,, | Performed by: STUDENT IN AN ORGANIZED HEALTH CARE EDUCATION/TRAINING PROGRAM

## 2023-04-26 PROCEDURE — 3079F PR MOST RECENT DIASTOLIC BLOOD PRESSURE 80-89 MM HG: ICD-10-PCS | Mod: CPTII,S$GLB,, | Performed by: STUDENT IN AN ORGANIZED HEALTH CARE EDUCATION/TRAINING PROGRAM

## 2023-04-26 PROCEDURE — 1159F PR MEDICATION LIST DOCUMENTED IN MEDICAL RECORD: ICD-10-PCS | Mod: CPTII,S$GLB,, | Performed by: STUDENT IN AN ORGANIZED HEALTH CARE EDUCATION/TRAINING PROGRAM

## 2023-04-26 PROCEDURE — 99214 OFFICE O/P EST MOD 30 MIN: CPT | Mod: S$GLB,,, | Performed by: STUDENT IN AN ORGANIZED HEALTH CARE EDUCATION/TRAINING PROGRAM

## 2023-04-26 PROCEDURE — 36415 COLL VENOUS BLD VENIPUNCTURE: CPT | Performed by: STUDENT IN AN ORGANIZED HEALTH CARE EDUCATION/TRAINING PROGRAM

## 2023-04-26 RX ORDER — DOXYLAMINE SUCCINATE 25 MG
TABLET ORAL 2 TIMES DAILY
Qty: 106 G | Refills: 0 | Status: SHIPPED | OUTPATIENT
Start: 2023-04-26

## 2023-05-14 DIAGNOSIS — E03.9 ACQUIRED HYPOTHYROIDISM: ICD-10-CM

## 2023-05-14 RX ORDER — LEVOTHYROXINE SODIUM 175 UG/1
175 TABLET ORAL DAILY
Qty: 90 TABLET | Refills: 3 | Status: SHIPPED | OUTPATIENT
Start: 2023-05-14

## 2023-05-14 NOTE — TELEPHONE ENCOUNTER
No care due was identified.  [facility] Embedded Care Due Messages. Reference number: [id number].   5/14/2023 11:11:22 AM CDT

## 2023-05-15 NOTE — TELEPHONE ENCOUNTER
Refill Decision Note   Adi Siddiqui  is requesting a refill authorization.  Brief Assessment and Rationale for Refill:  Approve     Medication Therapy Plan:       Medication Reconciliation Completed: No   Comments:     No Care Gaps recommended.     Note composed:8:06 PM 05/14/2023

## 2023-07-31 DIAGNOSIS — E78.1 HYPERTRIGLYCERIDEMIA: ICD-10-CM

## 2023-07-31 DIAGNOSIS — E78.1 FAMILIAL HYPERTRIGLYCERIDEMIA: ICD-10-CM

## 2023-07-31 RX ORDER — FENOFIBRATE 48 MG/1
48 TABLET, FILM COATED ORAL DAILY
Qty: 90 TABLET | Refills: 4 | Status: SHIPPED | OUTPATIENT
Start: 2023-07-31

## 2023-07-31 NOTE — TELEPHONE ENCOUNTER
Care Due:                  Date            Visit Type   Department     Provider  --------------------------------------------------------------------------------                                EP -                              PRIMARY      [facility] INTERNAL  Last Visit: 04-      CARE ([facility])   MEDICINE       Cory Andrade  Next Visit: None Scheduled  None         None Found                                                            Last  Test          Frequency    Reason                     Performed    Due Date  --------------------------------------------------------------------------------    CBC.........  12 months..  fenofibrate..............  07-   07-    [facility] Embedded Care Due Messages. Reference number: [id number].   7/31/2023 3:24:42 PM CDT

## 2024-06-05 ENCOUNTER — PATIENT MESSAGE (OUTPATIENT)
Dept: INTERNAL MEDICINE | Facility: CLINIC | Age: 29
End: 2024-06-05
Payer: COMMERCIAL

## 2024-08-05 DIAGNOSIS — E03.9 ACQUIRED HYPOTHYROIDISM: ICD-10-CM

## 2024-08-06 ENCOUNTER — OFFICE VISIT (OUTPATIENT)
Dept: INTERNAL MEDICINE | Facility: CLINIC | Age: 29
End: 2024-08-06
Payer: COMMERCIAL

## 2024-08-06 ENCOUNTER — LAB VISIT (OUTPATIENT)
Dept: LAB | Facility: OTHER | Age: 29
End: 2024-08-06
Attending: STUDENT IN AN ORGANIZED HEALTH CARE EDUCATION/TRAINING PROGRAM
Payer: COMMERCIAL

## 2024-08-06 VITALS
WEIGHT: 243.38 LBS | SYSTOLIC BLOOD PRESSURE: 118 MMHG | HEART RATE: 80 BPM | BODY MASS INDEX: 32.26 KG/M2 | OXYGEN SATURATION: 97 % | DIASTOLIC BLOOD PRESSURE: 82 MMHG | HEIGHT: 73 IN

## 2024-08-06 DIAGNOSIS — K76.0 HEPATIC STEATOSIS: ICD-10-CM

## 2024-08-06 DIAGNOSIS — E03.9 ACQUIRED HYPOTHYROIDISM: Primary | ICD-10-CM

## 2024-08-06 DIAGNOSIS — E78.1 HYPERTRIGLYCERIDEMIA: ICD-10-CM

## 2024-08-06 DIAGNOSIS — Z00.00 ANNUAL PHYSICAL EXAM: ICD-10-CM

## 2024-08-06 DIAGNOSIS — E03.9 ACQUIRED HYPOTHYROIDISM: ICD-10-CM

## 2024-08-06 DIAGNOSIS — E78.1 FAMILIAL HYPERTRIGLYCERIDEMIA: ICD-10-CM

## 2024-08-06 DIAGNOSIS — I10 PRIMARY HYPERTENSION: ICD-10-CM

## 2024-08-06 DIAGNOSIS — Z00.00 ANNUAL PHYSICAL EXAM: Primary | ICD-10-CM

## 2024-08-06 DIAGNOSIS — E66.01 MORBID OBESITY: ICD-10-CM

## 2024-08-06 PROBLEM — S29.011A PECTORALIS MUSCLE STRAIN: Status: RESOLVED | Noted: 2022-01-05 | Resolved: 2024-08-06

## 2024-08-06 LAB
ALBUMIN SERPL BCP-MCNC: 4.1 G/DL (ref 3.5–5.2)
ALP SERPL-CCNC: 60 U/L (ref 55–135)
ALT SERPL W/O P-5'-P-CCNC: 43 U/L (ref 10–44)
ANION GAP SERPL CALC-SCNC: 11 MMOL/L (ref 8–16)
AST SERPL-CCNC: 23 U/L (ref 10–40)
BASOPHILS # BLD AUTO: 0.05 K/UL (ref 0–0.2)
BASOPHILS NFR BLD: 0.8 % (ref 0–1.9)
BILIRUB SERPL-MCNC: 0.5 MG/DL (ref 0.1–1)
BUN SERPL-MCNC: 15 MG/DL (ref 6–20)
CALCIUM SERPL-MCNC: 9.5 MG/DL (ref 8.7–10.5)
CHLORIDE SERPL-SCNC: 104 MMOL/L (ref 95–110)
CHOLEST SERPL-MCNC: 204 MG/DL (ref 120–199)
CHOLEST/HDLC SERPL: 7.8 {RATIO} (ref 2–5)
CO2 SERPL-SCNC: 23 MMOL/L (ref 23–29)
CREAT SERPL-MCNC: 0.8 MG/DL (ref 0.5–1.4)
DIFFERENTIAL METHOD BLD: NORMAL
EOSINOPHIL # BLD AUTO: 0.3 K/UL (ref 0–0.5)
EOSINOPHIL NFR BLD: 4.4 % (ref 0–8)
ERYTHROCYTE [DISTWIDTH] IN BLOOD BY AUTOMATED COUNT: 11.7 % (ref 11.5–14.5)
EST. GFR  (NO RACE VARIABLE): >60 ML/MIN/1.73 M^2
ESTIMATED AVG GLUCOSE: 91 MG/DL (ref 68–131)
GLUCOSE SERPL-MCNC: 86 MG/DL (ref 70–110)
HBA1C MFR BLD: 4.8 % (ref 4–5.6)
HCT VFR BLD AUTO: 44 % (ref 40–54)
HDLC SERPL-MCNC: 26 MG/DL (ref 40–75)
HDLC SERPL: 12.7 % (ref 20–50)
HGB BLD-MCNC: 15.5 G/DL (ref 14–18)
IMM GRANULOCYTES # BLD AUTO: 0.02 K/UL (ref 0–0.04)
IMM GRANULOCYTES NFR BLD AUTO: 0.3 % (ref 0–0.5)
LDLC SERPL CALC-MCNC: ABNORMAL MG/DL (ref 63–159)
LYMPHOCYTES # BLD AUTO: 2.2 K/UL (ref 1–4.8)
LYMPHOCYTES NFR BLD: 33.5 % (ref 18–48)
MCH RBC QN AUTO: 30.5 PG (ref 27–31)
MCHC RBC AUTO-ENTMCNC: 35.2 G/DL (ref 32–36)
MCV RBC AUTO: 86 FL (ref 82–98)
MONOCYTES # BLD AUTO: 0.5 K/UL (ref 0.3–1)
MONOCYTES NFR BLD: 7.3 % (ref 4–15)
NEUTROPHILS # BLD AUTO: 3.6 K/UL (ref 1.8–7.7)
NEUTROPHILS NFR BLD: 53.7 % (ref 38–73)
NONHDLC SERPL-MCNC: 178 MG/DL
NRBC BLD-RTO: 0 /100 WBC
PLATELET # BLD AUTO: 221 K/UL (ref 150–450)
PMV BLD AUTO: 9.6 FL (ref 9.2–12.9)
POTASSIUM SERPL-SCNC: 3.6 MMOL/L (ref 3.5–5.1)
PROT SERPL-MCNC: 7.5 G/DL (ref 6–8.4)
RBC # BLD AUTO: 5.09 M/UL (ref 4.6–6.2)
SODIUM SERPL-SCNC: 138 MMOL/L (ref 136–145)
T4 FREE SERPL-MCNC: 1.04 NG/DL (ref 0.71–1.51)
TRIGL SERPL-MCNC: 677 MG/DL (ref 30–150)
TSH SERPL DL<=0.005 MIU/L-ACNC: 8.04 UIU/ML (ref 0.4–4)
WBC # BLD AUTO: 6.6 K/UL (ref 3.9–12.7)

## 2024-08-06 PROCEDURE — 99999 PR PBB SHADOW E&M-EST. PATIENT-LVL III: CPT | Mod: PBBFAC,,, | Performed by: STUDENT IN AN ORGANIZED HEALTH CARE EDUCATION/TRAINING PROGRAM

## 2024-08-06 PROCEDURE — 3079F DIAST BP 80-89 MM HG: CPT | Mod: CPTII,S$GLB,, | Performed by: STUDENT IN AN ORGANIZED HEALTH CARE EDUCATION/TRAINING PROGRAM

## 2024-08-06 PROCEDURE — 80061 LIPID PANEL: CPT | Performed by: STUDENT IN AN ORGANIZED HEALTH CARE EDUCATION/TRAINING PROGRAM

## 2024-08-06 PROCEDURE — 36415 COLL VENOUS BLD VENIPUNCTURE: CPT | Performed by: STUDENT IN AN ORGANIZED HEALTH CARE EDUCATION/TRAINING PROGRAM

## 2024-08-06 PROCEDURE — 99395 PREV VISIT EST AGE 18-39: CPT | Mod: S$GLB,,, | Performed by: STUDENT IN AN ORGANIZED HEALTH CARE EDUCATION/TRAINING PROGRAM

## 2024-08-06 PROCEDURE — 84439 ASSAY OF FREE THYROXINE: CPT | Performed by: STUDENT IN AN ORGANIZED HEALTH CARE EDUCATION/TRAINING PROGRAM

## 2024-08-06 PROCEDURE — 85025 COMPLETE CBC W/AUTO DIFF WBC: CPT | Performed by: STUDENT IN AN ORGANIZED HEALTH CARE EDUCATION/TRAINING PROGRAM

## 2024-08-06 PROCEDURE — 3074F SYST BP LT 130 MM HG: CPT | Mod: CPTII,S$GLB,, | Performed by: STUDENT IN AN ORGANIZED HEALTH CARE EDUCATION/TRAINING PROGRAM

## 2024-08-06 PROCEDURE — 84443 ASSAY THYROID STIM HORMONE: CPT | Performed by: STUDENT IN AN ORGANIZED HEALTH CARE EDUCATION/TRAINING PROGRAM

## 2024-08-06 PROCEDURE — 3008F BODY MASS INDEX DOCD: CPT | Mod: CPTII,S$GLB,, | Performed by: STUDENT IN AN ORGANIZED HEALTH CARE EDUCATION/TRAINING PROGRAM

## 2024-08-06 PROCEDURE — 1159F MED LIST DOCD IN RCRD: CPT | Mod: CPTII,S$GLB,, | Performed by: STUDENT IN AN ORGANIZED HEALTH CARE EDUCATION/TRAINING PROGRAM

## 2024-08-06 PROCEDURE — 80053 COMPREHEN METABOLIC PANEL: CPT | Performed by: STUDENT IN AN ORGANIZED HEALTH CARE EDUCATION/TRAINING PROGRAM

## 2024-08-06 PROCEDURE — 83036 HEMOGLOBIN GLYCOSYLATED A1C: CPT | Performed by: STUDENT IN AN ORGANIZED HEALTH CARE EDUCATION/TRAINING PROGRAM

## 2024-08-06 RX ORDER — LEVOTHYROXINE SODIUM 175 UG/1
175 TABLET ORAL DAILY
Qty: 90 TABLET | Refills: 0 | Status: SHIPPED | OUTPATIENT
Start: 2024-08-06

## 2024-08-07 ENCOUNTER — TELEPHONE (OUTPATIENT)
Dept: INTERNAL MEDICINE | Facility: CLINIC | Age: 29
End: 2024-08-07
Payer: COMMERCIAL

## 2024-09-19 ENCOUNTER — LAB VISIT (OUTPATIENT)
Dept: LAB | Facility: OTHER | Age: 29
End: 2024-09-19
Attending: STUDENT IN AN ORGANIZED HEALTH CARE EDUCATION/TRAINING PROGRAM
Payer: COMMERCIAL

## 2024-09-19 DIAGNOSIS — E03.9 ACQUIRED HYPOTHYROIDISM: ICD-10-CM

## 2024-09-19 DIAGNOSIS — E78.1 HYPERTRIGLYCERIDEMIA: Primary | ICD-10-CM

## 2024-09-19 DIAGNOSIS — E78.1 FAMILIAL HYPERTRIGLYCERIDEMIA: ICD-10-CM

## 2024-09-19 LAB
CHOLEST SERPL-MCNC: 180 MG/DL (ref 120–199)
CHOLEST/HDLC SERPL: 6 {RATIO} (ref 2–5)
HDLC SERPL-MCNC: 30 MG/DL (ref 40–75)
HDLC SERPL: 16.7 % (ref 20–50)
LDLC SERPL CALC-MCNC: ABNORMAL MG/DL (ref 63–159)
NONHDLC SERPL-MCNC: 150 MG/DL
T4 FREE SERPL-MCNC: 1.31 NG/DL (ref 0.71–1.51)
TRIGL SERPL-MCNC: 577 MG/DL (ref 30–150)
TSH SERPL DL<=0.005 MIU/L-ACNC: 5.19 UIU/ML (ref 0.4–4)

## 2024-09-19 PROCEDURE — 36415 COLL VENOUS BLD VENIPUNCTURE: CPT | Performed by: STUDENT IN AN ORGANIZED HEALTH CARE EDUCATION/TRAINING PROGRAM

## 2024-09-19 PROCEDURE — 84443 ASSAY THYROID STIM HORMONE: CPT | Performed by: STUDENT IN AN ORGANIZED HEALTH CARE EDUCATION/TRAINING PROGRAM

## 2024-09-19 PROCEDURE — 80061 LIPID PANEL: CPT | Performed by: STUDENT IN AN ORGANIZED HEALTH CARE EDUCATION/TRAINING PROGRAM

## 2024-09-19 PROCEDURE — 84439 ASSAY OF FREE THYROXINE: CPT | Performed by: STUDENT IN AN ORGANIZED HEALTH CARE EDUCATION/TRAINING PROGRAM

## 2024-09-19 RX ORDER — LEVOTHYROXINE SODIUM 200 UG/1
200 TABLET ORAL
Qty: 60 TABLET | Refills: 0 | Status: SHIPPED | OUTPATIENT
Start: 2024-09-19 | End: 2025-09-19

## 2024-09-19 RX ORDER — FENOFIBRATE 134 MG/1
134 CAPSULE ORAL
Qty: 90 CAPSULE | Refills: 3 | Status: SHIPPED | OUTPATIENT
Start: 2024-09-19 | End: 2025-09-19

## 2024-09-19 NOTE — TELEPHONE ENCOUNTER
No care due was identified.  [facility] Embedded Care Due Messages. Reference number: [id number].   9/19/2024 3:20:23 PM CDT

## 2024-09-19 NOTE — TELEPHONE ENCOUNTER
Spoke to patient, gave results, patient understood and verbalized. Scheduled labs.     He is taking the levothyroxine on an empty stomach. Please send new dose to pharmacy.

## 2024-09-19 NOTE — TELEPHONE ENCOUNTER
----- Message from Cory Andrade MD sent at 9/19/2024  3:05 PM CDT -----  Good afternoon,    TSH is better, let's go up to 200 (as long as you are taking on empty stomach without coffee or any other food).  Triglycerides are still very high.  Let's bump up your dose of fenofibrate.  Recheck both in 6 weeks please.    Sincerely,  Cory Andrade MD

## 2024-11-18 DIAGNOSIS — E03.9 ACQUIRED HYPOTHYROIDISM: ICD-10-CM

## 2024-11-18 RX ORDER — LEVOTHYROXINE SODIUM 200 UG/1
200 TABLET ORAL
Qty: 15 TABLET | Refills: 0 | Status: SHIPPED | OUTPATIENT
Start: 2024-11-18 | End: 2025-11-18

## 2024-11-18 NOTE — TELEPHONE ENCOUNTER
Refill Routing Note   Medication(s) are not appropriate for processing by [facility] for the following reason(s):        New or recently adjusted medication    [facility] action(s):  Defer               Appointments  past 12m or future 3m with PCP    Date Provider   Last Visit   8/6/2024 Cory Andrade MD   Next Visit   Visit date not found Cory Andrade MD   ED visits in past 90 days: 0        Note composed:5:30 PM 11/18/2024

## 2024-11-18 NOTE — TELEPHONE ENCOUNTER
No care due was identified.  [facility] Embedded Care Due Messages. Reference number: [id number].   11/18/2024 10:30:55 AM CST

## 2024-12-10 ENCOUNTER — LAB VISIT (OUTPATIENT)
Dept: LAB | Facility: OTHER | Age: 29
End: 2024-12-10
Attending: STUDENT IN AN ORGANIZED HEALTH CARE EDUCATION/TRAINING PROGRAM
Payer: COMMERCIAL

## 2024-12-10 DIAGNOSIS — E03.9 ACQUIRED HYPOTHYROIDISM: ICD-10-CM

## 2024-12-10 DIAGNOSIS — E78.1 FAMILIAL HYPERTRIGLYCERIDEMIA: ICD-10-CM

## 2024-12-10 LAB
CHOLEST SERPL-MCNC: 178 MG/DL (ref 120–199)
CHOLEST/HDLC SERPL: 5.2 {RATIO} (ref 2–5)
HDLC SERPL-MCNC: 34 MG/DL (ref 40–75)
HDLC SERPL: 19.1 % (ref 20–50)
LDLC SERPL CALC-MCNC: ABNORMAL MG/DL (ref 63–159)
NONHDLC SERPL-MCNC: 144 MG/DL
T4 FREE SERPL-MCNC: 1.27 NG/DL (ref 0.71–1.51)
TRIGL SERPL-MCNC: 443 MG/DL (ref 30–150)
TSH SERPL DL<=0.005 MIU/L-ACNC: 5.06 UIU/ML (ref 0.4–4)

## 2024-12-10 PROCEDURE — 80061 LIPID PANEL: CPT | Performed by: STUDENT IN AN ORGANIZED HEALTH CARE EDUCATION/TRAINING PROGRAM

## 2024-12-10 PROCEDURE — 84443 ASSAY THYROID STIM HORMONE: CPT | Performed by: STUDENT IN AN ORGANIZED HEALTH CARE EDUCATION/TRAINING PROGRAM

## 2024-12-10 PROCEDURE — 36415 COLL VENOUS BLD VENIPUNCTURE: CPT | Performed by: STUDENT IN AN ORGANIZED HEALTH CARE EDUCATION/TRAINING PROGRAM

## 2024-12-10 PROCEDURE — 84439 ASSAY OF FREE THYROXINE: CPT | Performed by: STUDENT IN AN ORGANIZED HEALTH CARE EDUCATION/TRAINING PROGRAM

## 2024-12-12 ENCOUNTER — TELEPHONE (OUTPATIENT)
Dept: INTERNAL MEDICINE | Facility: CLINIC | Age: 29
End: 2024-12-12
Payer: COMMERCIAL

## 2024-12-12 NOTE — TELEPHONE ENCOUNTER
----- Message from Cory Andrade MD sent at 12/11/2024  6:47 PM CST -----  Staff, please appt with LEENA or Gloria STERN/Gloria-  Perhaps he's not taking synthroid on an empty stomach which is preventing absorption and hypothyroid can cause high triglycerides?  But also there is probably a diet/habit component as well?    Maybe add wellbutrin (if no hx of seizures, and no hx of bulimia) for any potential depression, craving control (food alcohol smoking) if interested?

## 2024-12-20 ENCOUNTER — OFFICE VISIT (OUTPATIENT)
Dept: INTERNAL MEDICINE | Facility: CLINIC | Age: 29
End: 2024-12-20
Payer: COMMERCIAL

## 2024-12-20 ENCOUNTER — PATIENT MESSAGE (OUTPATIENT)
Dept: INTERNAL MEDICINE | Facility: CLINIC | Age: 29
End: 2024-12-20

## 2024-12-20 VITALS
SYSTOLIC BLOOD PRESSURE: 129 MMHG | WEIGHT: 240.06 LBS | HEIGHT: 73 IN | BODY MASS INDEX: 31.82 KG/M2 | DIASTOLIC BLOOD PRESSURE: 80 MMHG | OXYGEN SATURATION: 96 % | HEART RATE: 74 BPM

## 2024-12-20 DIAGNOSIS — E78.1 FAMILIAL HYPERTRIGLYCERIDEMIA: ICD-10-CM

## 2024-12-20 DIAGNOSIS — F32.A DEPRESSION, UNSPECIFIED DEPRESSION TYPE: ICD-10-CM

## 2024-12-20 DIAGNOSIS — E78.1 HYPERTRIGLYCERIDEMIA: Primary | ICD-10-CM

## 2024-12-20 DIAGNOSIS — E03.9 ACQUIRED HYPOTHYROIDISM: ICD-10-CM

## 2024-12-20 DIAGNOSIS — I10 PRIMARY HYPERTENSION: ICD-10-CM

## 2024-12-20 PROCEDURE — 3044F HG A1C LEVEL LT 7.0%: CPT | Mod: CPTII,S$GLB,,

## 2024-12-20 PROCEDURE — 3079F DIAST BP 80-89 MM HG: CPT | Mod: CPTII,S$GLB,,

## 2024-12-20 PROCEDURE — 1159F MED LIST DOCD IN RCRD: CPT | Mod: CPTII,S$GLB,,

## 2024-12-20 PROCEDURE — 99214 OFFICE O/P EST MOD 30 MIN: CPT | Mod: S$GLB,,,

## 2024-12-20 PROCEDURE — 3074F SYST BP LT 130 MM HG: CPT | Mod: CPTII,S$GLB,,

## 2024-12-20 PROCEDURE — 99999 PR PBB SHADOW E&M-EST. PATIENT-LVL III: CPT | Mod: PBBFAC,,,

## 2024-12-20 PROCEDURE — 3008F BODY MASS INDEX DOCD: CPT | Mod: CPTII,S$GLB,,

## 2024-12-20 RX ORDER — LEVOTHYROXINE SODIUM 200 UG/1
200 TABLET ORAL
Qty: 60 TABLET | Refills: 0 | Status: SHIPPED | OUTPATIENT
Start: 2024-12-20 | End: 2025-02-18

## 2024-12-20 NOTE — PROGRESS NOTES
"INTERNAL MEDICINE  [facility]    Reason for visit:   Chief Complaint   Patient presents with    Thyroid Problem    Follow-up     HPI: Adi Siddiqui Jr. is a 29 y.o. male presenting today for follow-up regarding lab results.    Patient is an established patient of PCP, Dr. Cory Andrade MD. This patient is new to me.    HYPOTHYROIDISM:  Lab Results   Component Value Date    TSH 5.056 (H) 12/10/2024   Medication dose adjustment recently made in levothyroxine from 175 mcg to 200 mcg. Dose was increased 11/18 and only mild improvement has been seen in TSH from 5.187 to 5.056. T4 is in normal range. Discussed medication practices with Mr. Siddiqui. He does make an effort to take levothyroxine first thing in the morning and without food, though he does admit to some missed doses and taking it later in the day when he remembers. He also takes the medication with fenofibrate.    HYPERTRIGLYCERIDEMIA:  Slight improvement has also been seen in triglycerides from 677 to 577 after increasing fenofibrate to 134 mg on 11/18. Further discussion regarding diet and lifestyle- He currently smokes half a pack of cigarettes daily and expresses interest in reducing consumption but not cessation. He declines offers for medication-assisted cessation or participation in smoking cessation program. He consumes alcohol twice weekly with "heavy" drinking on those occasions. He previously maintained an active lifestyle, cycling  miles weekly, but is currently inactive. He reports efforts to improve his diet, but he acknowledges room for improvement. He inquires about specific foods to avoid/foods to increase.    Inquired about mental health and he does not exhibit depressive symptoms.     ROS:  General: -fever, -chills, -fatigue, -weight gain, -weight loss  Eyes: -vision changes, -redness, -discharge  ENT: -ear pain, -nasal congestion, -sore throat  Cardiovascular: -chest pain, -palpitations, -lower extremity " "edema  Respiratory: -cough, -shortness of breath  Gastrointestinal: -abdominal pain, -nausea, -vomiting, -diarrhea, -constipation, -blood in stool  Genitourinary: -dysuria, -hematuria, -frequency  Musculoskeletal: -joint pain, -muscle pain  Skin: -rash, -lesion  Neurological: -headache, -dizziness, -numbness, -tingling  Psychiatric: -anxiety, -depression, -sleep difficulty         Social History     Social History Narrative    Not on file       ALLERGIES:   Review of patient's allergies indicates:  No Known Allergies    MEDS:   Current Outpatient Medications on File Prior to Visit   Medication Sig Dispense Refill Last Dose/Taking    fenofibrate micronized (LOFIBRA) 134 MG Cap Take 1 capsule (134 mg total) by mouth daily with breakfast. 90 capsule 3 Taking       Vital signs:   Vitals:    12/20/24 1017   BP: 129/80   BP Location: Right arm   Patient Position: Sitting   Pulse: 74   SpO2: 96%   Weight: 108.9 kg (240 lb 1.3 oz)   Height: 6' 1" (1.854 m)     Body mass index is 31.67 kg/m².    PHYSICAL EXAM:     Physical Exam    General: No acute distress. Well-developed. Well-nourished.  Eyes: EOMI. Sclerae anicteric.  HENT: Normocephalic. Atraumatic. Nares patent. Moist oral mucosa.  Ears: Bilateral TMs clear. Bilateral EACs clear.  Cardiovascular: Regular rate. Regular rhythm. No murmurs. No rubs. No gallops. Normal S1, S2.  Respiratory: Normal respiratory effort. Clear to auscultation bilaterally. No rales. No rhonchi. No wheezing.  Abdomen: Soft. Non-tender. Non-distended. Normoactive bowel sounds.  Musculoskeletal: No  obvious deformity.  Extremities: No lower extremity edema.  Neurological: Alert & oriented x3. No slurred speech. Normal gait.  Psychiatric: Normal mood. Normal affect. Good insight. Good judgment.  Skin: Warm. Dry. No rash.         PERTINENT RESULTS:   No visits with results within 1 Week(s) from this visit.   Latest known visit with results is:   Lab Visit on 12/10/2024   Component Date Value Ref " Range Status    Cholesterol 12/10/2024 178  120 - 199 mg/dL Final    Comment: The National Cholesterol Education Program (NCEP) has set the  following guidelines (reference ranges) for Cholesterol:  Optimal.....................<200 mg/dL  Borderline High.............200-239 mg/dL  High........................> or = 240 mg/dL      Triglycerides 12/10/2024 443 (H)  30 - 150 mg/dL Final    Comment: The National Cholesterol Education Program (NCEP) has set the  following guidelines (reference values) for triglycerides:  Normal......................<150 mg/dL  Borderline High.............150-199 mg/dL  High........................200-499 mg/dL      HDL 12/10/2024 34 (L)  40 - 75 mg/dL Final    Comment: The National Cholesterol Education Program (NCEP) has set the  following guidelines (reference values) for HDL Cholesterol:  Low...............<40 mg/dL  Optimal...........>60 mg/dL      LDL Cholesterol 12/10/2024 Invalid, Trig>400.0  63.0 - 159.0 mg/dL Final    Comment: The National Cholesterol Education Program (NCEP) has set the  following guidelines (reference values) for LDL Cholesterol:  Optimal.......................<130 mg/dL  Borderline High...............130-159 mg/dL  High..........................160-189 mg/dL  Very High.....................>190 mg/dL      HDL/Cholesterol Ratio 12/10/2024 19.1 (L)  20.0 - 50.0 % Final    Total Cholesterol/HDL Ratio 12/10/2024 5.2 (H)  2.0 - 5.0 Final    Non-HDL Cholesterol 12/10/2024 144  mg/dL Final    Comment: Risk category and Non-HDL cholesterol goals:  Coronary heart disease (CHD)or equivalent (10-year risk of CHD >20%):  Non-HDL cholesterol goal     <130 mg/dL  Two or more CHD risk factors and 10-year risk of CHD <= 20%:  Non-HDL cholesterol goal     <160 mg/dL  0 to 1 CHD risk factor:  Non-HDL cholesterol goal     <190 mg/dL      TSH 12/10/2024 5.056 (H)  0.400 - 4.000 uIU/mL Final    Free T4 12/10/2024 1.27  0.71 - 1.51 ng/dL Final       ASSESSMENT/PLAN:    Assessment &  Plan    IMPRESSION:  - Assessed thyroid and triglyceride management, noting some improvement in lab work after medication adjustments  - Considered impact of medication timing, diet, alcohol consumption, and physical activity on triglyceride levels  - Evaluated risk of pancreatitis due to chronically elevated triglycerides  - Discussed smoking cessation, but patient not ready to quit at this time    HYPOTHYROIDISM:  - Emphasized the importance of proper timing for Synthroid administration (on empty stomach, 30 minutes before eating or taking other medications).  - Continued Synthroid 200 mcg daily, to be taken first thing in the morning on an empty stomach; wait 30 minutes before eating or taking other medications.    HYPERLIPIDEMIA:  - Discussed the relationship between diet, alcohol consumption, and triglyceride levels.  - Educated on the risk of pancreatitis associated with chronically elevated triglycerides.  - Provided information on Mediterranean diet and foods to avoid for managing triglycerides.    DIETARY COUNSELING:  - Recommend following Mediterranean diet.  - Patient to avoid saturated fats (red meat, fried foods, egg yolks, cheese, ice cream, dairy).  - Recommend increasing intake of healthy fats (Omega-3s, fatty fish like salmon, mackerel, and sardines, avocados, raw nuts).  - Patient to use olive oil instead of canola oil for cooking.  - Recommend avoiding/limiting alcohol consumption.    EXERCISE COUNSELING:  - Patient to aim for 150 minutes of moderate-intensity cardiovascular exercise per week (30 minutes, 5 days a week).    TOBACCO USE:  - Patient to reduce smoking (currently 0.5 pack per day).    FOLLOW-UP PLAN:  - Fasting labs ordered in 6 weeks (around January 20th): TSH, T4, and lipid panel.  - Follow up in 6 weeks after lab work for virtual visit.        1. Hypertriglyceridemia  -     LIPID PANEL; Future; Expected date: 01/20/2025    2. Familial hypertriglyceridemia  Assessment & Plan:  - On  fenofibrate 134 mg  - Repeat FLP 6 weeks    Orders:  -     LIPID PANEL; Future; Expected date: 01/20/2025    3. Acquired hypothyroidism  -     TSH; Future; Expected date: 01/20/2025  -     T4, FREE; Future; Expected date: 01/20/2025  -     levothyroxine (SYNTHROID) 200 MCG tablet; Take 1 tablet (200 mcg total) by mouth before breakfast.  Dispense: 60 tablet; Refill: 0    4. Depression, unspecified depression type  Assessment & Plan:  - He reports this is controlled and stable without requiring medication  - Discussed counseling/therapy and he does not feel it is necessary at this time      5. Primary hypertension  Assessment & Plan:  - Controlled, not requiring medication        Health maintenance addressed: UTD   Vaccines recommended: Pneumococcal, Influenza, Covid-19 update    Follow up in about 6 weeks (around 1/31/2025) for medication, Virtual Visit.     RICARDO RodriguezC   Internal Medicine

## 2024-12-22 NOTE — ASSESSMENT & PLAN NOTE
- He reports this is controlled and stable without requiring medication  - Discussed counseling/therapy and he does not feel it is necessary at this time

## 2025-03-18 DIAGNOSIS — E03.9 ACQUIRED HYPOTHYROIDISM: ICD-10-CM

## 2025-03-19 RX ORDER — LEVOTHYROXINE SODIUM 200 UG/1
200 TABLET ORAL
Qty: 30 TABLET | Refills: 0 | Status: SHIPPED | OUTPATIENT
Start: 2025-03-19 | End: 2025-05-18

## 2025-03-19 NOTE — TELEPHONE ENCOUNTER
Left voice mail for patient to call the office back to schedule lab appointment. Also sent portal message

## 2025-04-16 DIAGNOSIS — E03.9 ACQUIRED HYPOTHYROIDISM: ICD-10-CM

## 2025-04-16 RX ORDER — LEVOTHYROXINE SODIUM 200 UG/1
200 TABLET ORAL
Qty: 20 TABLET | Refills: 0 | Status: SHIPPED | OUTPATIENT
Start: 2025-04-16

## 2025-04-16 NOTE — TELEPHONE ENCOUNTER
No care due was identified.  [facility] Embedded Care Due Messages. Reference number: [id number].   4/16/2025 12:17:04 AM CDT

## 2025-04-16 NOTE — TELEPHONE ENCOUNTER
Refill Routing Note   Medication(s) are not appropriate for processing by [facility] for the following reason(s):        Required labs abnormal    [facility] action(s):  Defer      Medication Therapy Plan: NEEDS TSH FOR REFILL      Appointments  past 12m or future 3m with PCP    Date Provider   Last Visit   8/6/2024 Cory Andrade MD   Next Visit   Visit date not found Cory Andrade MD   ED visits in past 90 days: 0        Note composed:5:16 PM 04/16/2025

## 2025-05-30 ENCOUNTER — OFFICE VISIT (OUTPATIENT)
Dept: INTERNAL MEDICINE | Facility: CLINIC | Age: 30
End: 2025-05-30
Payer: COMMERCIAL

## 2025-05-30 VITALS — HEIGHT: 73 IN | BODY MASS INDEX: 31.67 KG/M2 | DIASTOLIC BLOOD PRESSURE: 80 MMHG | SYSTOLIC BLOOD PRESSURE: 129 MMHG

## 2025-05-30 DIAGNOSIS — E03.9 ACQUIRED HYPOTHYROIDISM: ICD-10-CM

## 2025-05-30 DIAGNOSIS — E78.1 HYPERTRIGLYCERIDEMIA: ICD-10-CM

## 2025-05-30 DIAGNOSIS — E78.1 FAMILIAL HYPERTRIGLYCERIDEMIA: Primary | ICD-10-CM

## 2025-05-30 DIAGNOSIS — I10 PRIMARY HYPERTENSION: ICD-10-CM

## 2025-05-30 DIAGNOSIS — Z00.00 ANNUAL PHYSICAL EXAM: ICD-10-CM

## 2025-05-30 RX ORDER — LEVOTHYROXINE SODIUM 200 UG/1
200 TABLET ORAL
Qty: 20 TABLET | Refills: 0 | Status: SHIPPED | OUTPATIENT
Start: 2025-05-30

## 2025-05-30 NOTE — PROGRESS NOTES
The patient location is: [place]  The chief complaint leading to consultation is: thyroid labs    Visit type: audiovisual    Face to Face time with patient: 14  15 minutes of total time spent on the encounter, which includes face to face time and non-face to face time preparing to see the patient (eg, review of tests), Obtaining and/or reviewing separately obtained history, Documenting clinical information in the electronic or other health record, Independently interpreting results (not separately reported) and communicating results to the patient/family/caregiver, or Care coordination (not separately reported).         Each patient to whom he or she provides medical services by telemedicine is:  (1) informed of the relationship between the physician and patient and the respective role of any other health care provider with respect to management of the patient; and (2) notified that he or she may decline to receive medical services by telemedicine and may withdraw from such care at any time.    Notes:       28 yo male w/ hypothyroid, HTN, hepatic steatosis, smoker who presents for tg, thyroid f/u. Need of meds.    Doing well    Counseled extensively on cardiovascular disease, quitting smoking, changing drinking habits.  Dad with afib, pt with low HDL.  Reviewed labs    Hypothyroid- significant hypertriglyceridemia in past  200 mcg  Recheck due    Elevated ALT int he past, normalized  Aware hepatic steatosis  Heavy drinking twice a week *8 beers a time*, advised cut back    Elevated TG  Fenofibrate 45, taking   recheck    Hepatic steatosis  Monitor LFTS    Elevated bp in the past, no meds and controlled  Lost weight, and doing better  Recheck today    Not addressed  Past stomach pain- miralax/metamucil fiber pills, better, dislikes powder    Smoke  4-5 years 0.5 a day-  or more a day  Counseled on cessation further, GF does not smoke    Considering lifting weights, GF does    Depression  In remission without  meds  Used to take lexapro, stopped 2/2 sexual side effects     Working lots  Film industry; working more in hamburger restaurant    [facility]    Subjective:       Patient ID: Adi Siddiqui Jr. is a 29 y.o. male.    Chief Complaint: discuss labs      History was obtained from the patient and supplemented through chart review.  This patient is known to me.    HPI:    Patient is a 29 y.o. male w/   Past Medical History:   Diagnosis Date    Depression     Hypertriglyceridemia     Hypothyroidism          History of Present Illness    CHIEF COMPLAINT:  Mr. Siddiqui presents today for thyroid medication follow-up and lab work.    THYROID MANAGEMENT:  TSH was elevated in December. He is taking levothyroxine 200 mcg with improved compliance, taking it on an empty stomach and waiting 30 minutes before eating.    DIET AND EXERCISE:  He has been making more conscious eating choices and recently resumed bike riding, aiming for 60 miles per week. He reports good energy levels.    CURRENT MEDICATIONS:  He continues fenofibrate and levothyroxine.      ROS:  General: +increased energy levels             Lab Results   Component Value Date    HGBA1C 4.8 08/06/2024    HGBA1C 4.8 07/15/2022     Lab Results   Component Value Date    LDLCALC Invalid, Trig>400.0 12/10/2024    CREATININE 0.8 08/06/2024         Wt Readings from Last 15 Encounters:   12/20/24 108.9 kg (240 lb 1.3 oz)   08/06/24 110.4 kg (243 lb 6.2 oz)   04/26/23 109.3 kg (240 lb 15.4 oz)   01/17/23 114.3 kg (251 lb 15.8 oz)   07/15/22 115 kg (253 lb 8.5 oz)   01/05/22 114.9 kg (253 lb 4.9 oz)   05/18/21 104.8 kg (231 lb)   04/07/21 104.9 kg (231 lb 4.2 oz)   02/19/21 107.1 kg (236 lb 1.8 oz)   09/21/20 108.4 kg (239 lb)   03/09/20 108.7 kg (239 lb 10.2 oz)   02/22/19 114.9 kg (253 lb 4.9 oz)   03/17/18 97.5 kg (215 lb)   07/13/17 98.6 kg (217 lb 6 oz)   12/23/16 95.3 kg (210 lb)       Medical History  Past Medical History:   Diagnosis Date    Depression  "    Hypertriglyceridemia     Hypothyroidism        Review of Systems   Constitutional:  Negative for activity change and unexpected weight change.   HENT:  Negative for hearing loss, rhinorrhea and trouble swallowing.    Eyes:  Negative for discharge and visual disturbance.   Respiratory:  Negative for chest tightness and wheezing.    Cardiovascular:  Negative for chest pain and palpitations.   Gastrointestinal:  Negative for blood in stool, constipation, diarrhea and vomiting.   Endocrine: Negative for polydipsia and polyuria.   Genitourinary:  Negative for difficulty urinating, hematuria and urgency.   Musculoskeletal:  Negative for arthralgias, joint swelling and neck pain.   Neurological:  Negative for weakness and headaches.   Psychiatric/Behavioral:  Negative for confusion and dysphoric mood.          Surgical hx, family hx, social hx   Have been reviewed    Current Medications[1]    Objective:        Body mass index is 31.67 kg/m².  Vitals:    05/30/25 1403   BP: 129/80   Height: 6' 1" (1.854 m)   PainSc: 0-No pain     Physical Exam  Vitals and nursing note reviewed.   Constitutional:       General: He is not in acute distress.     Appearance: Normal appearance. He is not ill-appearing.   HENT:      Head: Normocephalic and atraumatic.   Eyes:      General: No scleral icterus.  Pulmonary:      Effort: Pulmonary effort is normal.   Musculoskeletal:         General: No deformity.   Neurological:      Mental Status: He is alert and oriented to person, place, and time.   Psychiatric:         Behavior: Behavior normal.               Lab Results   Component Value Date    WBC 6.60 08/06/2024    HGB 15.5 08/06/2024    HCT 44.0 08/06/2024     08/06/2024    CHOL 178 12/10/2024    TRIG 443 (H) 12/10/2024    HDL 34 (L) 12/10/2024    ALT 43 08/06/2024    AST 23 08/06/2024     08/06/2024    K 3.6 08/06/2024     08/06/2024    CREATININE 0.8 08/06/2024    BUN 15 08/06/2024    CO2 23 08/06/2024    TSH 5.056 " (H) 12/10/2024    HGBA1C 4.8 08/06/2024       The ASCVD Risk score (Colten MARSHALL, et al., 2019) failed to calculate for the following reasons:    The 2019 ASCVD risk score is only valid for ages 40 to 79    (Imaging have been independently reviewed)  none    Assessment:         1. Familial hypertriglyceridemia    2. Acquired hypothyroidism    3. Hypertriglyceridemia    4. Primary hypertension    5. Annual physical exam          Plan:     Adi was seen today for discuss labs.    Diagnoses and all orders for this visit:    Familial hypertriglyceridemia  -     Lipid Panel; Future    Acquired hypothyroidism  -     levothyroxine (SYNTHROID) 200 MCG tablet; Take 1 tablet (200 mcg total) by mouth before breakfast.    Hypertriglyceridemia    Primary hypertension    Annual physical exam  -     Comprehensive Metabolic Panel; Future  -     CBC Auto Differential; Future  -     Hemoglobin A1C; Future        Assessment & Plan    IMPRESSION:  1. Reassessed thyroid function due to previously elevated TSH in December.  2. Evaluated impact of improved medication compliance on thyroid levels.  3. Continued management of hepatic steatosis, known to be diet-driven.  4. Monitored HTN.    MORBID OBESITY:   Mr. Siddiqui is working to improve health by resuming routine of riding bike 60 miles weekly and making more conscious eating choices.   Reports improved energy levels and feels on the upswing.   Discussed self-care and dietary challenges at age 29.   Ordered annual labs including A1C, CBC, and liver function tests.    HYPOTHYROIDISM:   Continue levothyroxine 200 mcg daily, to be taken on an empty stomach, waiting 30 minutes before eating.   TSH was elevated in December, indicating suboptimal control.   Ordered TSH lab for reassessment of thyroid function.   Sent prescription ref[facility] to [facility].   Noted improved compliance with medication timing instructions.    HYPERLIPIDEMIA:   Continue fenofibrate for management of hyperlipidemia.   Ordered  lipid panel to recheck cholesterol and triglycerides as part of annual labs.    FOLLOW-UP AND GENERAL LABS:   Instructed patient to fast before labs.             All of your core healthy metrics are met.      Follow up in about 3 months (around 8/30/2025) for annual or sooner if needed (ok to just schedule). or sooner prn            This note was generated with the assistance of ambient listening technology. Verbal consent was obtained by the patient and accompanying visitor(s) for the recording of patient appointment to facilitate this note. I attest to having reviewed and edited the generated note for accuracy, though some syntax or spelling errors may persist. Please contact the author of this note for any clarification.      Visit today is associated with current or anticipated ongoing medical care related to this patient's single serious condition/complex condition of htn, hepatic steatosis. The patient will return to see me as these issues will be followed longitudinally.    All medications were reviewed including potential side effects and risks/benefits.  Pt was counseled to call back if anything worsens or if questions arise.        Cory Andrade MD  Family Medicine  [facility]  [facility] [address]  Phone [phone]  Fax [phone]                               [1]   Current Outpatient Medications:     fenofibrate micronized (LOFIBRA) 134 MG Cap, Take 1 capsule (134 mg total) by mouth daily with breakfast., Disp: 90 capsule, Rfl: 3    levothyroxine (SYNTHROID) 200 MCG tablet, Take 1 tablet (200 mcg total) by mouth before breakfast., Disp: 20 tablet, Rfl: 0

## 2025-06-06 ENCOUNTER — LAB VISIT (OUTPATIENT)
Dept: LAB | Facility: OTHER | Age: 30
End: 2025-06-06
Attending: STUDENT IN AN ORGANIZED HEALTH CARE EDUCATION/TRAINING PROGRAM
Payer: COMMERCIAL

## 2025-06-06 DIAGNOSIS — E78.1 FAMILIAL HYPERTRIGLYCERIDEMIA: ICD-10-CM

## 2025-06-06 DIAGNOSIS — Z00.00 ANNUAL PHYSICAL EXAM: ICD-10-CM

## 2025-06-06 LAB
ABSOLUTE EOSINOPHIL (OHS): 0.29 K/UL
ABSOLUTE MONOCYTE (OHS): 0.47 K/UL (ref 0.3–1)
ABSOLUTE NEUTROPHIL COUNT (OHS): 3.05 K/UL (ref 1.8–7.7)
ALBUMIN SERPL BCP-MCNC: 4.2 G/DL (ref 3.5–5.2)
ALP SERPL-CCNC: 59 UNIT/L (ref 40–150)
ALT SERPL W/O P-5'-P-CCNC: 41 UNIT/L (ref 10–44)
ANION GAP (OHS): 12 MMOL/L (ref 8–16)
AST SERPL-CCNC: 25 UNIT/L (ref 11–45)
BASOPHILS # BLD AUTO: 0.07 K/UL
BASOPHILS NFR BLD AUTO: 1.1 %
BILIRUB SERPL-MCNC: 0.5 MG/DL (ref 0.1–1)
BUN SERPL-MCNC: 16 MG/DL (ref 6–20)
CALCIUM SERPL-MCNC: 9.5 MG/DL (ref 8.7–10.5)
CHLORIDE SERPL-SCNC: 109 MMOL/L (ref 95–110)
CHOLEST SERPL-MCNC: 164 MG/DL (ref 120–199)
CHOLEST/HDLC SERPL: 4.6 {RATIO} (ref 2–5)
CO2 SERPL-SCNC: 22 MMOL/L (ref 23–29)
CREAT SERPL-MCNC: 0.9 MG/DL (ref 0.5–1.4)
EAG (OHS): 91 MG/DL (ref 68–131)
ERYTHROCYTE [DISTWIDTH] IN BLOOD BY AUTOMATED COUNT: 12.2 % (ref 11.5–14.5)
GFR SERPLBLD CREATININE-BSD FMLA CKD-EPI: >60 ML/MIN/1.73/M2
GLUCOSE SERPL-MCNC: 89 MG/DL (ref 70–110)
HBA1C MFR BLD: 4.8 % (ref 4–5.6)
HCT VFR BLD AUTO: 41.1 % (ref 40–54)
HDLC SERPL-MCNC: 36 MG/DL (ref 40–75)
HDLC SERPL: 22 % (ref 20–50)
HGB BLD-MCNC: 14.5 GM/DL (ref 14–18)
IMM GRANULOCYTES # BLD AUTO: 0.03 K/UL (ref 0–0.04)
IMM GRANULOCYTES NFR BLD AUTO: 0.5 % (ref 0–0.5)
LDLC SERPL CALC-MCNC: 72.6 MG/DL (ref 63–159)
LYMPHOCYTES # BLD AUTO: 2.32 K/UL (ref 1–4.8)
MCH RBC QN AUTO: 31.4 PG (ref 27–31)
MCHC RBC AUTO-ENTMCNC: 35.3 G/DL (ref 32–36)
MCV RBC AUTO: 89 FL (ref 82–98)
NONHDLC SERPL-MCNC: 128 MG/DL
NUCLEATED RBC (/100WBC) (OHS): 0 /100 WBC
PLATELET # BLD AUTO: 225 K/UL (ref 150–450)
PMV BLD AUTO: 9.8 FL (ref 9.2–12.9)
POTASSIUM SERPL-SCNC: 3.9 MMOL/L (ref 3.5–5.1)
PROT SERPL-MCNC: 7.6 GM/DL (ref 6–8.4)
RBC # BLD AUTO: 4.62 M/UL (ref 4.6–6.2)
RELATIVE EOSINOPHIL (OHS): 4.7 %
RELATIVE LYMPHOCYTE (OHS): 37.2 % (ref 18–48)
RELATIVE MONOCYTE (OHS): 7.5 % (ref 4–15)
RELATIVE NEUTROPHIL (OHS): 49 % (ref 38–73)
SODIUM SERPL-SCNC: 143 MMOL/L (ref 136–145)
TRIGL SERPL-MCNC: 277 MG/DL (ref 30–150)
WBC # BLD AUTO: 6.23 K/UL (ref 3.9–12.7)

## 2025-06-06 PROCEDURE — 83718 ASSAY OF LIPOPROTEIN: CPT

## 2025-06-06 PROCEDURE — 85025 COMPLETE CBC W/AUTO DIFF WBC: CPT

## 2025-06-06 PROCEDURE — 82040 ASSAY OF SERUM ALBUMIN: CPT

## 2025-06-06 PROCEDURE — 36415 COLL VENOUS BLD VENIPUNCTURE: CPT

## 2025-06-06 PROCEDURE — 83036 HEMOGLOBIN GLYCOSYLATED A1C: CPT

## 2025-06-09 ENCOUNTER — RESULTS FOLLOW-UP (OUTPATIENT)
Dept: INTERNAL MEDICINE | Facility: CLINIC | Age: 30
End: 2025-06-09

## 2025-06-11 DIAGNOSIS — E03.9 ACQUIRED HYPOTHYROIDISM: ICD-10-CM

## 2025-06-11 NOTE — TELEPHONE ENCOUNTER
No care due was identified.  [facility] Embedded Care Due Messages. Reference number: [id number].   6/11/2025 5:31:53 PM CDT

## 2025-06-12 RX ORDER — LEVOTHYROXINE SODIUM 200 UG/1
200 TABLET ORAL
Qty: 20 TABLET | Refills: 0 | Status: SHIPPED | OUTPATIENT
Start: 2025-06-12

## 2025-06-12 NOTE — TELEPHONE ENCOUNTER
Unfortunately the TSH was not obtained.    Will need to have blood drawn again. Sorry it worked out that way    jl

## 2025-07-14 NOTE — PROGRESS NOTES
Abdominal Pain  How Lon years    Frequency: Multiple times week    Location: Generalized    Quality: Gripping, squeezing    Radiate: No    Aggravated or Improved with bowel movement  /eating/ movement Tums, BM  better - worse eating    Medications tried:  Tums, fiber supplements    Nausea/Vomiting/Unexplained Weight Loss: No    Pyrosis/Reflux/Dysphagia: No    Bowel Movements: Daily    Melena/Hematochezia: No     Symptoms: No    GLP-1s:  No    NSAIDs:  Infrequently    Anticoagulation or Antiplatelet: No    History of H.pylori: No    Prior Colonoscopy: No    Prior Upper Endoscopy: No    Family h/o Crohn's  no   Ulcerative Colitis: No    Family h/o Celiac Sprue: No    Family h/o Colon Cancer:     No    Abdominal Surgeries: No        Gastroenterology Clinic Consultation Note    Patient ID: Adi Siddiqui Jr. is a 29 y.o. male.    Chief Complaint: Abdominal Pain    History of Present Illness    CHIEF COMPLAINT:  Patient presents today for chronic abdominal pain and gas.    ABDOMINAL PAIN:  He reports chronic abdominal pain for approximately 8 years. Pain is located in the general abdominal area, characterized as grippy, squeezing, and wretched. Episodes occur 3-4 times per week, lasting multiple hours, and can occur even after eating healthy for 2-3 days. Pain is somewhat improved with bowel movements. He denies pain radiation, specific positional or movement triggers. He experiences associated symptoms of abdominal bloating and cramping, suggestive of potential irritable bowel syndrome.    GI SYMPTOMS:  He denies nausea, vomiting, weight loss, or swallowing issues. He reports occasional reflux symptoms, characterized by mild burning in chest and acid taste in mouth. He has regular bowel movements twice daily with good consistency and denies blood in stool or dark stools.    FOOD INTOLERANCES:  He suspects gluten intolerance as symptoms worsen with certain foods, particularly sandwiches and pizza. Two slices  of pizza trigger significant GI distress. He typically avoids bread and often eats burgers without buns. He reports only mild gassiness with dairy consumption. He is interested in pursuing celiac testing.    MEDICATIONS:  He takes thyroid and triglyceride medications. Uses Tums occasionally but desires to reduce frequency. Takes fiber supplements intermittently when remembered, and occasional ibuprofen. Recent lab work showed improvement in triglyceride levels.    PAST SURGICAL HISTORY:  No history of abdominal surgeries.      ROS:  General: -fever, -chills, -fatigue, -weight gain, -weight loss  Eyes: -vision changes, -redness, -discharge  ENT: -ear pain, -nasal congestion, -sore throat  Cardiovascular: -chest pain, -palpitations, -lower extremity edema  Respiratory: -cough, -shortness of breath  Gastrointestinal: +abdominal pain, -nausea, -vomiting, -diarrhea, -constipation, -blood in stool, +bloating, +indigestion  Genitourinary: -dysuria, -hematuria, -frequency  Musculoskeletal: -joint pain, -muscle pain  Skin: -rash, -lesion  Neurological: -headache, -dizziness, -numbness, -tingling  Psychiatric: -anxiety, -depression, -sleep difficulty         Physical Exam  Vitals and nursing note reviewed.   Constitutional:       General: He is not in acute distress.     Appearance: Normal appearance. He is not ill-appearing.   HENT:      Head: Normocephalic and atraumatic.      Right Ear: External ear normal.      Left Ear: External ear normal.      Nose: Nose normal.   Eyes:      General: No scleral icterus.     Extraocular Movements: Extraocular movements intact.   Cardiovascular:      Rate and Rhythm: Normal rate.   Pulmonary:      Effort: Pulmonary effort is normal. No respiratory distress.   Abdominal:      General: There is no distension.      Tenderness: There is no guarding.   Musculoskeletal:         General: Normal range of motion.      Cervical back: Normal range of motion.   Skin:     General: Skin is warm and  "dry.   Neurological:      Mental Status: He is alert and oriented to person, place, and time.   Psychiatric:         Mood and Affect: Mood normal.         Behavior: Behavior normal.         Thought Content: Thought content normal.         Medical History:  has a past medical history of Depression, Hypertriglyceridemia, and Hypothyroidism.    Surgical History:  has a past surgical history that includes NASAL RECONSTRUCTION; Excision turbinate, submucous; Nasal septum surgery; and Tonsillectomy.    Family History: family history includes Atrial fibrillation in his father; Breast cancer in his maternal grandmother; Coronary artery disease in his father; Heart disease in his father; Hyperlipidemia in his father; Hyperthyroidism in his mother; Hypothyroidism in his maternal grandmother..       Review of patient's allergies indicates:  No Known Allergies    Medications Ordered Prior to Encounter[1]    Labs:  Lab Results   Component Value Date    WBC 6.23 06/06/2025    HGB 14.5 06/06/2025    HCT 41.1 06/06/2025     06/06/2025    CHOL 164 06/06/2025    TRIG 277 (H) 06/06/2025    HDL 36 (L) 06/06/2025    ALKPHOS 59 06/06/2025    ALT 41 06/06/2025    AST 25 06/06/2025     06/06/2025    K 3.9 06/06/2025     06/06/2025    CREATININE 0.9 06/06/2025    BUN 16 06/06/2025    CO2 22 (L) 06/06/2025    TSH 5.056 (H) 12/10/2024    HGBA1C 4.8 06/06/2025       Vital Signs:  BP (!) 139/90   Pulse 84   Ht 6' 1" (1.854 m)   Wt 109.6 kg (241 lb 10 oz)   BMI 31.88 kg/m²   Body mass index is 31.88 kg/m².    Imaging reviewed:   US ABDOMEN COMPLETE     CLINICAL HISTORY:  Elevation of levels of liver transaminase levels     TECHNIQUE:  Complete abdominal ultrasound (including pancreas, aorta, liver, gallbladder, common bile duct, IVC, kidneys, and spleen) was performed.     COMPARISON:  None     FINDINGS:  The visualized portions of the pancreas are unremarkable.  The visualized portions of the aorta and IVC are " unremarkable.  The gallbladder is nondistended.  No gallbladder wall thickening or pericholecystic fluid.  No gallbladder wall hyperemia.  Sonographic Hernandez sign is negative.  The common duct is not dilated measuring 0.3 cm.  The hepatic parenchyma is homogeneous noting echogenic echotexture.  The liver is not enlarged.  The kidneys are unremarkable.  The spleen is upper limit of normal in size.  No ascites.     Impression:     No findings to suggest acute cholecystitis.     Hepatic findings suggest steatosis, correlation with LFTs recommended.     Borderline splenomegaly.        Electronically signed by:Fan Aponte MD  Date:                                            07/23/2022      Endoscopy reviewed: NA       Assessment:  1. Gastroesophageal reflux disease, unspecified whether esophagitis present    2. Generalized abdominal discomfort    3. Obesity (BMI 30.0-34.9)    4. Flatulence/gas pain/belching      Orders Placed This Encounter    Tissue transglutaminase, IgA    IgA    H. pylori Antibody, IgG    pantoprazole (PROTONIX) 40 MG tablet    dicyclomine (BENTYL) 10 MG capsule       Assessment & Plan      ABDOMINAL PAIN / IRRITABLE BOWEL SYNDROME (IBS):  - Evaluated for possible Irritable Bowel Syndrome (IBS) given generalized GI symptoms and diet-related exacerbations.  - Explained potential link between certain healthy foods (e.g., spinach, cauliflower, broccoli, kale) and IBS symptom exacerbation.  - Discussed gut-brain interaction in IBS and how stress and anxiety can trigger symptoms.  - Started Bentyl: Take as needed for abdominal pain, up to 3 times daily. Use caution as overdoing it can cause constipation. Can be taken preemptively if a trigger is identified.    GASTROESOPHAGEAL REFLUX DISEASE (GERD) / GASTRITIS:  - Assessed for potential silent reflux or gastritis.  - Started Protonix: Take 30-45 minutes before eating or drinking anything in the morning to address possible gastric acid-related issues  while awaiting test results.    CELIAC DISEASE:  - Considered celiac disease as potential cause of long-standing abdominal pain and suspected food intolerances.  - Educated on celiac disease, explaining that it can present with varying degrees of severity and is not always an anaphylactic reaction.  - Ordered labs for celiac disease screening.    H. PYLORI INFECTION:  - Investigating H. pylori due to compatibility with reported symptoms.  - Informed about prevalence of H. pylori and its potential to cause gastritis and ulcers.  - Ordered labs for H. pylori screening.    FOLLOW-UP:  - Contact the office for lab results.  - Follow up if celiac test is positive to discuss scheduling upper endoscopy for biopsy confirmation.           NOLVIA MAYFIELD  Gastroenterology Department  [facility] [phone]     This note was generated with the assistance of ambient listening technology. Verbal consent was obtained by the patient and accompanying visitor(s) for the recording of patient appointment to facilitate this note. I attest to having reviewed and edited the generated note for accuracy, though some syntax or spelling errors may persist. Please contact the author of this note for any clarification.                      [1]   Current Outpatient Medications on File Prior to Visit   Medication Sig Dispense Refill    fenofibrate micronized (LOFIBRA) 134 MG Cap Take 1 capsule (134 mg total) by mouth daily with breakfast. 90 capsule 3    levothyroxine (SYNTHROID) 200 MCG tablet Take 1 tablet (200 mcg total) by mouth before breakfast. 20 tablet 0     No current facility-administered medications on file prior to visit.

## 2025-07-15 ENCOUNTER — OFFICE VISIT (OUTPATIENT)
Dept: GASTROENTEROLOGY | Facility: CLINIC | Age: 30
End: 2025-07-15
Payer: COMMERCIAL

## 2025-07-15 ENCOUNTER — LAB VISIT (OUTPATIENT)
Dept: LAB | Facility: HOSPITAL | Age: 30
End: 2025-07-15
Payer: COMMERCIAL

## 2025-07-15 VITALS
WEIGHT: 241.63 LBS | HEIGHT: 73 IN | SYSTOLIC BLOOD PRESSURE: 139 MMHG | DIASTOLIC BLOOD PRESSURE: 90 MMHG | HEART RATE: 84 BPM | BODY MASS INDEX: 32.02 KG/M2

## 2025-07-15 DIAGNOSIS — R10.84 GENERALIZED ABDOMINAL DISCOMFORT: Primary | ICD-10-CM

## 2025-07-15 DIAGNOSIS — R14.0 FLATULENCE/GAS PAIN/BELCHING: ICD-10-CM

## 2025-07-15 DIAGNOSIS — K21.9 GASTROESOPHAGEAL REFLUX DISEASE, UNSPECIFIED WHETHER ESOPHAGITIS PRESENT: ICD-10-CM

## 2025-07-15 DIAGNOSIS — E66.811 OBESITY (BMI 30.0-34.9): ICD-10-CM

## 2025-07-15 DIAGNOSIS — R10.84 GENERALIZED ABDOMINAL DISCOMFORT: ICD-10-CM

## 2025-07-15 LAB — IGA SERPL-MCNC: 272 MG/DL (ref 40–350)

## 2025-07-15 PROCEDURE — 82784 ASSAY IGA/IGD/IGG/IGM EACH: CPT

## 2025-07-15 PROCEDURE — 3075F SYST BP GE 130 - 139MM HG: CPT | Mod: CPTII,S$GLB,,

## 2025-07-15 PROCEDURE — 99999 PR PBB SHADOW E&M-EST. PATIENT-LVL III: CPT | Mod: PBBFAC,,,

## 2025-07-15 PROCEDURE — 3080F DIAST BP >= 90 MM HG: CPT | Mod: CPTII,S$GLB,,

## 2025-07-15 PROCEDURE — 99204 OFFICE O/P NEW MOD 45 MIN: CPT | Mod: S$GLB,,,

## 2025-07-15 PROCEDURE — 86677 HELICOBACTER PYLORI ANTIBODY: CPT

## 2025-07-15 PROCEDURE — 3044F HG A1C LEVEL LT 7.0%: CPT | Mod: CPTII,S$GLB,,

## 2025-07-15 PROCEDURE — 86364 TISS TRNSGLTMNASE EA IG CLAS: CPT

## 2025-07-15 PROCEDURE — 1159F MED LIST DOCD IN RCRD: CPT | Mod: CPTII,S$GLB,,

## 2025-07-15 PROCEDURE — 3008F BODY MASS INDEX DOCD: CPT | Mod: CPTII,S$GLB,,

## 2025-07-15 RX ORDER — PANTOPRAZOLE SODIUM 40 MG/1
40 TABLET, DELAYED RELEASE ORAL DAILY
Qty: 30 TABLET | Refills: 2 | Status: SHIPPED | OUTPATIENT
Start: 2025-07-15 | End: 2025-10-13

## 2025-07-15 RX ORDER — DICYCLOMINE HYDROCHLORIDE 10 MG/1
10 CAPSULE ORAL 3 TIMES DAILY PRN
Qty: 30 CAPSULE | Refills: 2 | Status: SHIPPED | OUTPATIENT
Start: 2025-07-15

## 2025-07-16 LAB — H PYLORI IGG SERPL QL IA: NEGATIVE

## 2025-07-17 LAB — W TISSUE TRANSGLUTAMINASE IGA AB: 0.5 U/ML

## 2025-07-22 DIAGNOSIS — E03.9 ACQUIRED HYPOTHYROIDISM: ICD-10-CM

## 2025-07-22 RX ORDER — LEVOTHYROXINE SODIUM 200 UG/1
200 TABLET ORAL
Qty: 20 TABLET | Refills: 0 | Status: SHIPPED | OUTPATIENT
Start: 2025-07-22

## 2025-07-22 NOTE — TELEPHONE ENCOUNTER
No care due was identified.  [facility] Embedded Care Due Messages. Reference number: [id number].   7/22/2025 9:23:50 AM CDT

## 2025-08-01 ENCOUNTER — LAB VISIT (OUTPATIENT)
Dept: LAB | Facility: OTHER | Age: 30
End: 2025-08-01
Attending: STUDENT IN AN ORGANIZED HEALTH CARE EDUCATION/TRAINING PROGRAM
Payer: COMMERCIAL

## 2025-08-01 DIAGNOSIS — E03.9 ACQUIRED HYPOTHYROIDISM: ICD-10-CM

## 2025-08-01 LAB — TSH SERPL-ACNC: 0.96 UIU/ML (ref 0.4–4)

## 2025-08-01 PROCEDURE — 84443 ASSAY THYROID STIM HORMONE: CPT

## 2025-08-01 PROCEDURE — 36415 COLL VENOUS BLD VENIPUNCTURE: CPT
